# Patient Record
Sex: MALE | Race: WHITE | NOT HISPANIC OR LATINO | Employment: FULL TIME | URBAN - METROPOLITAN AREA
[De-identification: names, ages, dates, MRNs, and addresses within clinical notes are randomized per-mention and may not be internally consistent; named-entity substitution may affect disease eponyms.]

---

## 2018-10-17 ENCOUNTER — ANESTHESIA EVENT (OUTPATIENT)
Dept: EMERGENCY MEDICINE | Facility: CLINIC | Age: 33
End: 2018-10-17
Payer: COMMERCIAL

## 2018-10-17 ENCOUNTER — ANESTHESIA (OUTPATIENT)
Dept: EMERGENCY MEDICINE | Facility: CLINIC | Age: 33
End: 2018-10-17
Payer: COMMERCIAL

## 2018-10-17 ENCOUNTER — HOSPITAL ENCOUNTER (EMERGENCY)
Facility: CLINIC | Age: 33
Discharge: HOME | End: 2018-10-17
Attending: FAMILY MEDICINE | Admitting: FAMILY MEDICINE
Payer: COMMERCIAL

## 2018-10-17 VITALS
TEMPERATURE: 97.2 F | BODY MASS INDEX: 30.36 KG/M2 | HEIGHT: 69 IN | WEIGHT: 205 LBS | SYSTOLIC BLOOD PRESSURE: 132 MMHG | HEART RATE: 152 BPM | RESPIRATION RATE: 15 BRPM | DIASTOLIC BLOOD PRESSURE: 101 MMHG | OXYGEN SATURATION: 95 %

## 2018-10-17 DIAGNOSIS — I48.0 PAROXYSMAL ATRIAL FIBRILLATION (H): ICD-10-CM

## 2018-10-17 LAB
ANION GAP SERPL CALCULATED.3IONS-SCNC: 8 MMOL/L (ref 3–14)
BASOPHILS # BLD AUTO: 0 10E9/L (ref 0–0.2)
BASOPHILS NFR BLD AUTO: 0.3 %
BUN SERPL-MCNC: 7 MG/DL (ref 7–30)
CALCIUM SERPL-MCNC: 8.3 MG/DL (ref 8.5–10.1)
CHLORIDE SERPL-SCNC: 105 MMOL/L (ref 94–109)
CO2 SERPL-SCNC: 26 MMOL/L (ref 20–32)
CREAT SERPL-MCNC: 0.99 MG/DL (ref 0.66–1.25)
DIFFERENTIAL METHOD BLD: NORMAL
EOSINOPHIL # BLD AUTO: 0.1 10E9/L (ref 0–0.7)
EOSINOPHIL NFR BLD AUTO: 0.8 %
ERYTHROCYTE [DISTWIDTH] IN BLOOD BY AUTOMATED COUNT: 11.9 % (ref 10–15)
GFR SERPL CREATININE-BSD FRML MDRD: 87 ML/MIN/1.7M2
GLUCOSE SERPL-MCNC: 86 MG/DL (ref 70–99)
HCT VFR BLD AUTO: 45.6 % (ref 40–53)
HGB BLD-MCNC: 15.9 G/DL (ref 13.3–17.7)
IMM GRANULOCYTES # BLD: 0 10E9/L (ref 0–0.4)
IMM GRANULOCYTES NFR BLD: 0.2 %
LYMPHOCYTES # BLD AUTO: 1.9 10E9/L (ref 0.8–5.3)
LYMPHOCYTES NFR BLD AUTO: 29.6 %
MCH RBC QN AUTO: 31.7 PG (ref 26.5–33)
MCHC RBC AUTO-ENTMCNC: 34.9 G/DL (ref 31.5–36.5)
MCV RBC AUTO: 91 FL (ref 78–100)
MONOCYTES # BLD AUTO: 0.7 10E9/L (ref 0–1.3)
MONOCYTES NFR BLD AUTO: 10.4 %
NEUTROPHILS # BLD AUTO: 3.7 10E9/L (ref 1.6–8.3)
NEUTROPHILS NFR BLD AUTO: 58.7 %
NRBC # BLD AUTO: 0 10*3/UL
NRBC BLD AUTO-RTO: 0 /100
PLATELET # BLD AUTO: 250 10E9/L (ref 150–450)
POTASSIUM SERPL-SCNC: 3.6 MMOL/L (ref 3.4–5.3)
RBC # BLD AUTO: 5.02 10E12/L (ref 4.4–5.9)
SODIUM SERPL-SCNC: 139 MMOL/L (ref 133–144)
TSH SERPL DL<=0.005 MIU/L-ACNC: 1.56 MU/L (ref 0.4–4)
WBC # BLD AUTO: 6.4 10E9/L (ref 4–11)

## 2018-10-17 PROCEDURE — 96360 HYDRATION IV INFUSION INIT: CPT | Mod: 59

## 2018-10-17 PROCEDURE — 92960 CARDIOVERSION ELECTRIC EXT: CPT

## 2018-10-17 PROCEDURE — 84443 ASSAY THYROID STIM HORMONE: CPT | Performed by: FAMILY MEDICINE

## 2018-10-17 PROCEDURE — 40000671 ZZH STATISTIC ANESTHESIA CASE

## 2018-10-17 PROCEDURE — 93005 ELECTROCARDIOGRAM TRACING: CPT | Mod: XU

## 2018-10-17 PROCEDURE — 25000128 H RX IP 250 OP 636: Performed by: FAMILY MEDICINE

## 2018-10-17 PROCEDURE — 92960 CARDIOVERSION ELECTRIC EXT: CPT | Mod: Z6 | Performed by: FAMILY MEDICINE

## 2018-10-17 PROCEDURE — 96361 HYDRATE IV INFUSION ADD-ON: CPT | Mod: 59

## 2018-10-17 PROCEDURE — 99285 EMERGENCY DEPT VISIT HI MDM: CPT | Mod: 25

## 2018-10-17 PROCEDURE — 80048 BASIC METABOLIC PNL TOTAL CA: CPT | Performed by: FAMILY MEDICINE

## 2018-10-17 PROCEDURE — 93010 ELECTROCARDIOGRAM REPORT: CPT | Mod: 59 | Performed by: FAMILY MEDICINE

## 2018-10-17 PROCEDURE — 85025 COMPLETE CBC W/AUTO DIFF WBC: CPT | Performed by: FAMILY MEDICINE

## 2018-10-17 PROCEDURE — 25000128 H RX IP 250 OP 636: Performed by: NURSE ANESTHETIST, CERTIFIED REGISTERED

## 2018-10-17 PROCEDURE — 99285 EMERGENCY DEPT VISIT HI MDM: CPT | Mod: 25 | Performed by: FAMILY MEDICINE

## 2018-10-17 RX ORDER — SODIUM CHLORIDE, SODIUM LACTATE, POTASSIUM CHLORIDE, CALCIUM CHLORIDE 600; 310; 30; 20 MG/100ML; MG/100ML; MG/100ML; MG/100ML
INJECTION, SOLUTION INTRAVENOUS CONTINUOUS PRN
Status: DISCONTINUED | OUTPATIENT
Start: 2018-10-17 | End: 2018-10-17

## 2018-10-17 RX ORDER — PROPOFOL 10 MG/ML
INJECTION, EMULSION INTRAVENOUS PRN
Status: DISCONTINUED | OUTPATIENT
Start: 2018-10-17 | End: 2018-10-17

## 2018-10-17 RX ADMIN — PROPOFOL 100 MG: 10 INJECTION, EMULSION INTRAVENOUS at 13:31

## 2018-10-17 RX ADMIN — SODIUM CHLORIDE, POTASSIUM CHLORIDE, SODIUM LACTATE AND CALCIUM CHLORIDE: 600; 310; 30; 20 INJECTION, SOLUTION INTRAVENOUS at 13:31

## 2018-10-17 RX ADMIN — SODIUM CHLORIDE, POTASSIUM CHLORIDE, SODIUM LACTATE AND CALCIUM CHLORIDE 1000 ML: 600; 310; 30; 20 INJECTION, SOLUTION INTRAVENOUS at 13:29

## 2018-10-17 RX ADMIN — PROPOFOL 50 MG: 10 INJECTION, EMULSION INTRAVENOUS at 13:33

## 2018-10-17 NOTE — ANESTHESIA PREPROCEDURE EVALUATION
Physical Exam  Normal systems: cardiovascular, pulmonary and dental    Airway   Mallampati: II  TM distance: >3 FB  Neck ROM: full    Dental     Cardiovascular       Pulmonary                     Anesthesia Plan      History & Physical Review  History and physical reviewed and following examination; no interval change.    ASA Status:  2 emergent.    NPO Status:  > 6 hours    Plan for MAC Reason for MAC:  Deep or markedly invasive procedure (G8)         Postoperative Care      Consents  Anesthetic plan, risks, benefits and alternatives discussed with:  Patient..                          .

## 2018-10-17 NOTE — ED NOTES
Pt here feeling irregular heart rate, woke up with it this morning. Hx of a-fib, one episode of afib January 2017, was cardioverted at that time. Does not take any medications for HR control or blood thinners. Pt also reports that for the past month he hasn't been able to sleep well (was working overnights) and was consuming energy drinks regularly. Was feeling dizzy as well this morning, denies pain or SOA.

## 2018-10-17 NOTE — ED AVS SNAPSHOT
[facility] Emergency Department    [address] [address]    Phone:  [phone]    Fax:  [phone]                                       Colt Alegria   MRN: [id number]    Department:  [facility] Emergency Department   Date of Visit:  10/17/2018           After Visit Summary Signature Page     I have received my discharge instructions, and my questions have been answered. I have discussed any challenges I see with this plan with the nurse or doctor.    ..........................................................................................................................................  Patient/Patient Representative Signature      ..........................................................................................................................................  Patient Representative Print Name and Relationship to Patient    ..................................................               ................................................  Date                                   Time    ..........................................................................................................................................  Reviewed by Signature/Title    ...................................................              ..............................................  Date                                               Time          22EPIC Rev 08/18

## 2018-10-17 NOTE — ED AVS SNAPSHOT
[facility] Emergency Department    [address] [address]    Phone:  [phone]    Fax:  [phone]                                       Colt Alegria   MRN: [id number]    Department:  [facility] Emergency Department   Date of Visit:  10/17/2018           Patient Information     Date Of Birth          1985        Your diagnoses for this visit were:     Paroxysmal atrial fibrillation (H) Electrically cardioverted to normal sinus rhythm       You were seen by Ishaan Blanco MD.      Follow-up Information     Follow up with Electrophysiologist. Schedule an appointment as soon as possible for a visit in 1 month.        Discharge Instructions         Understanding Atrial Fibrillation    An arrhythmia is any problem with the speed or pattern of the heartbeat. Atrial fibrillation (AFib) is a common type of arrhythmia. It causes fast, chaotic electrical signals in the atria. This leads to poor functioning of the heart. It also affects how much blood your heart can pump out to the body.  Afib may occur once in a while and go away on its own. Or it may continue for longer periods and need treatment.  AFib can lead to serious problems, such as stroke. Your healthcare provider will need to monitor and manage it.  What happens during atrial fibrillation?   The heart has an electrical system that sends signals to control the heartbeat. As signals move through the heart, they tell the heart s upper chambers (atria) and lower chambers (ventricles) when to squeeze (contract) and relax. This lets blood move through the heart and out to the body and lungs.  With AFib, the atria receive abnormal signals. This causes them to contract in a fast and irregular way, and out of sync with the ventricles. When this happens, the atria also have a harder time moving blood into the ventricles. Blood may then pool in the atria, which increases the risk for blood clots and stroke. The ventricles  also may contract too quickly and irregularly. As a result, they may not pump blood to the body and lungs as well as they should. This can weaken the heart muscle over time and cause heart failure.  What causes atrial fibrillation?  AFib is more common in older adults. It has many possible causes including:    Coronary artery disease    Heart valve disease    Heart attack    Heart surgery    High blood pressure    Thyroid disease    Diabetes    Lung disease    Sleep apnea    Heavy alcohol use  In some cases of AFib, doctors do not know the cause.  What are the symptoms of atrial fibrillation?  AFib may or may not cause symptoms. If symptoms do occur, they may include:    A fast, pounding, irregular heartbeat    Shortness of breath    Tiredness    Dizziness or fainting    Chest pain  How is atrial fibrillation treated?  Treatments for AFib can include any of the options below.    Medicines. You may be prescribed:  ? Heart rate medicines to help slow down the heartbeat  ? Heart rhythm medicines to help the heart beat more regularly  ? Anti-clotting medicines to help reduce the risk for blood clots and stroke    Electrical cardioversion. Your healthcare provider uses special pads or paddles to send one or more brief electrical shocks to the heart. This can help reset the heartbeat to normal.    Ablation. Long, thin tubes called catheters are threaded through a blood vessel to the heart. There, the catheters send out hot or cold energy to the areas causing the abnormal signals. This energy destroys the problem tissue or cells. This improves the chances that your heart will stay in normal rhythm without using medicines. If your heart rate and rhythm can t be controlled, you may need ablation and a pacemaker. These will help control the heart rate and regularity of the heartbeat.    Surgery. During surgery, your healthcare provider may use different methods to create scar tissue in the areas of the heart causing the  abnormal signals. The scar tissue disrupts the abnormal signals and may stop AFib from occurring.  What are the complications of atrial fibrillation?  These can include:    Blood clots    Stroke    Heart failure. This problem occurs when the heart muscle weakens so much that it can no longer pump blood well.  When should I call my healthcare provider?  Call your healthcare provider right away if you have any of these:    Symptoms that don t get better with treatment, or get worse    New symptoms   Date Last Reviewed: 5/1/2016 2000-2017 [employer]. [address] [place] [address]. All rights reserved. This information is not intended as a substitute for professional medical care. Always follow your healthcare professional's instructions.      Aspirin 81 mg once daily.  Make sure you keep yourself well-hydrated and avoid alcohol and limit caffeine intake.  I would recommend that you follow-up for outpatient consultation with an electrophysiologist as we discussed.  If you have recurrent episodes please return to the emergency department for anything lasting longer than 6-8 hours.  Any symptoms of chest pain tightness or shortness of air should prompt immediate return to the emergency department.      24 Hour Appointment Hotline       To make an appointment at any [facility], call [phone] ([phone]). If you don't have a family doctor or clinic, we will help you find one. [facility] clinics are conveniently located to serve the needs of you and your family.             Review of your medicines      Notice     You have not been prescribed any medications.            Procedures and tests performed during your visit     Procedure/Test Number of Times Performed    Basic metabolic panel 1    CBC with platelets differential 1    Cardiac Continuous Monitoring 1    EKG 12 lead 2    Peripheral IV catheter 1    TSH with free T4 reflex 1      Orders Needing Specimen Collection     None       Pending Results     No orders found from 10/15/2018 to 10/18/2018.            Pending Culture Results     No orders found from 10/15/2018 to 10/18/2018.            Pending Results Instructions     If you had any lab results that were not finalized at the time of your Discharge, you can call the ED Lab Result RN at [phone]. You will be contacted by this team for any positive Lab results or changes in treatment. The nurses are available 7 days a week from 10A to 6:30P.  You can leave a message 24 hours per day and they will return your call.        Test Results From Your Hospital Stay        10/17/2018  1:13 PM      Component Results     Component Value Ref Range & Units Status    WBC 6.4 4.0 - 11.0 10e9/L Final    RBC Count 5.02 4.4 - 5.9 10e12/L Final    Hemoglobin 15.9 13.3 - 17.7 g/dL Final    Hematocrit 45.6 40.0 - 53.0 % Final    MCV 91 78 - 100 fl Final    MCH 31.7 26.5 - 33.0 pg Final    MCHC 34.9 31.5 - 36.5 g/dL Final    RDW 11.9 10.0 - 15.0 % Final    Platelet Count 250 150 - 450 10e9/L Final    Diff Method Automated Method  Final    % Neutrophils 58.7 % Final    % Lymphocytes 29.6 % Final    % Monocytes 10.4 % Final    % Eosinophils 0.8 % Final    % Basophils 0.3 % Final    % Immature Granulocytes 0.2 % Final    Nucleated RBCs 0 0 /100 Final    Absolute Neutrophil 3.7 1.6 - 8.3 10e9/L Final    Absolute Lymphocytes 1.9 0.8 - 5.3 10e9/L Final    Absolute Monocytes 0.7 0.0 - 1.3 10e9/L Final    Absolute Eosinophils 0.1 0.0 - 0.7 10e9/L Final    Absolute Basophils 0.0 0.0 - 0.2 10e9/L Final    Abs Immature Granulocytes 0.0 0 - 0.4 10e9/L Final    Absolute Nucleated RBC 0.0  Final         10/17/2018  1:32 PM      Component Results     Component Value Ref Range & Units Status    Sodium 139 133 - 144 mmol/L Final    Potassium 3.6 3.4 - 5.3 mmol/L Final    Chloride 105 94 - 109 mmol/L Final    Carbon Dioxide 26 20 - 32 mmol/L Final    Anion Gap 8 3 - 14 mmol/L Final    Glucose 86 70 - 99 mg/dL Final     "Urea Nitrogen 7 7 - 30 mg/dL Final    Creatinine 0.99 0.66 - 1.25 mg/dL Final    GFR Estimate 87 >60 mL/min/1.7m2 Final    Non  GFR Calc    GFR Estimate If Black >90 >60 mL/min/1.7m2 Final    African American GFR Calc    Calcium 8.3 (L) 8.5 - 10.1 mg/dL Final         10/17/2018  1:44 PM      Component Results     Component Value Ref Range & Units Status    TSH 1.56 0.40 - 4.00 mU/L Final                Thank you for choosing [facility]       Thank you for choosing [facility] for your care. Our goal is always to provide you with excellent care. Hearing back from our patients is one way we can continue to improve our services. Please take a few minutes to complete the written survey that you may receive in the mail after you visit with us. Thank you!        [employer] Information     [employer] lets you send messages to your doctor, view your test results, renew your prescriptions, schedule appointments and more. To sign up, go to [email or web address] . Click on \"Log in\" on the left side of the screen, which will take you to the Welcome page. Then click on \"Sign up Now\" on the right side of the page.     You will be asked to enter the access code listed below, as well as some personal information. Please follow the directions to create your username and password.     Your access code is: [email or web address]  Expires: 1/15/2019  3:02 PM     Your access code will  in 90 days. If you need help or a new code, please call your [facility] clinic or [phone].        Care EveryWhere ID     This is your Care EveryWhere ID. This could be used by other organizations to access your [facility] medical records  [id number]        Equal Access to Services     RAJESH JMAES : Raphael Phoenix, yovani pro, izzy christian. So [facility] [phone].    ATENCIÓN: Si habla español, tiene a jacobsen disposición servicios gratuitos de asistencia lingüística. Llame al " [phone].    We comply with applicable federal civil rights laws and [place] laws. We do not discriminate on the basis of race, color, national origin, age, disability, sex, sexual orientation, or gender identity.            After Visit Summary       This is your record. Keep this with you and show to your community pharmacist(s) and doctor(s) at your next visit.

## 2018-10-17 NOTE — DISCHARGE INSTRUCTIONS
Understanding Atrial Fibrillation    An arrhythmia is any problem with the speed or pattern of the heartbeat. Atrial fibrillation (AFib) is a common type of arrhythmia. It causes fast, chaotic electrical signals in the atria. This leads to poor functioning of the heart. It also affects how much blood your heart can pump out to the body.  Afib may occur once in a while and go away on its own. Or it may continue for longer periods and need treatment.  AFib can lead to serious problems, such as stroke. Your healthcare provider will need to monitor and manage it.  What happens during atrial fibrillation?   The heart has an electrical system that sends signals to control the heartbeat. As signals move through the heart, they tell the heart s upper chambers (atria) and lower chambers (ventricles) when to squeeze (contract) and relax. This lets blood move through the heart and out to the body and lungs.  With AFib, the atria receive abnormal signals. This causes them to contract in a fast and irregular way, and out of sync with the ventricles. When this happens, the atria also have a harder time moving blood into the ventricles. Blood may then pool in the atria, which increases the risk for blood clots and stroke. The ventricles also may contract too quickly and irregularly. As a result, they may not pump blood to the body and lungs as well as they should. This can weaken the heart muscle over time and cause heart failure.  What causes atrial fibrillation?  AFib is more common in older adults. It has many possible causes including:    Coronary artery disease    Heart valve disease    Heart attack    Heart surgery    High blood pressure    Thyroid disease    Diabetes    Lung disease    Sleep apnea    Heavy alcohol use  In some cases of AFib, doctors do not know the cause.  What are the symptoms of atrial fibrillation?  AFib may or may not cause symptoms. If symptoms do occur, they may include:    A fast, pounding,  irregular heartbeat    Shortness of breath    Tiredness    Dizziness or fainting    Chest pain  How is atrial fibrillation treated?  Treatments for AFib can include any of the options below.    Medicines. You may be prescribed:  ? Heart rate medicines to help slow down the heartbeat  ? Heart rhythm medicines to help the heart beat more regularly  ? Anti-clotting medicines to help reduce the risk for blood clots and stroke    Electrical cardioversion. Your healthcare provider uses special pads or paddles to send one or more brief electrical shocks to the heart. This can help reset the heartbeat to normal.    Ablation. Long, thin tubes called catheters are threaded through a blood vessel to the heart. There, the catheters send out hot or cold energy to the areas causing the abnormal signals. This energy destroys the problem tissue or cells. This improves the chances that your heart will stay in normal rhythm without using medicines. If your heart rate and rhythm can t be controlled, you may need ablation and a pacemaker. These will help control the heart rate and regularity of the heartbeat.    Surgery. During surgery, your healthcare provider may use different methods to create scar tissue in the areas of the heart causing the abnormal signals. The scar tissue disrupts the abnormal signals and may stop AFib from occurring.  What are the complications of atrial fibrillation?  These can include:    Blood clots    Stroke    Heart failure. This problem occurs when the heart muscle weakens so much that it can no longer pump blood well.  When should I call my healthcare provider?  Call your healthcare provider right away if you have any of these:    Symptoms that don t get better with treatment, or get worse    New symptoms   Date Last Reviewed: 5/1/2016 2000-2017 [employer]. [address] [place] [address], [place] [address]. All rights reserved. This information is not intended as a substitute for professional  medical care. Always follow your healthcare professional's instructions.      Aspirin 81 mg once daily.  Make sure you keep yourself well-hydrated and avoid alcohol and limit caffeine intake.  I would recommend that you follow-up for outpatient consultation with an electrophysiologist as we discussed.  If you have recurrent episodes please return to the emergency department for anything lasting longer than 6-8 hours.  Any symptoms of chest pain tightness or shortness of air should prompt immediate return to the emergency department.

## 2018-10-17 NOTE — ED PROVIDER NOTES
History     Chief Complaint   Patient presents with     Irregular Heart Beat     hx a fib     HPI  Colt Alegria is a 33 year old male, past medical history significant for hypertension atrial fibrillation, presents to the emergency department with concerns of atrial fibrillation onset this morning.  History is obtained from the patient and to a lesser extent from his significant other.  He states that he felt fine when he went to bed last night when he woke up this morning he noted that his heart was pounding like it was going to come out of his chest.  He was familiar with a sensation from his first episode and only other episode about 2 years ago.  At that time he was apparently anticoagulated and then ultimately cardioverted.  He was placed on anticoagulant medication but has not taken it.  He cannot really know what it was.  He has been placed on Cardizem for his blood pressure by his account but he does not take it.  He is recently transitioned from working nights to working days, his caffeine intake has been considerable while working nights over the last month.  He is just readjusting to working days.  He typically eats/drinks.  2-3 cups of caffeinated beverage per day.  He does not smoke.  Infrequent alcohol.  He is not aware of his thyroid status.  The patient denies associated symptoms such as lightheadedness nausea, vomiting, chest pain, pressure, or shortness of air.  He really only notices the rapid irregular heartbeat.  The patient is employed as a , he does considerable physical exercise as part of his job and works out on a regular basis.    Problem List:    Patient Active Problem List    Diagnosis Date Noted     CARDIOVASCULAR SCREENING; LDL GOAL LESS THAN 160 03/05/2014     Priority: Medium        Past Medical History:    Past Medical History:   Diagnosis Date     Hypertension        Past Surgical History:    Past Surgical History:   Procedure Laterality Date     HUMERUS SURGERY  " 2008    Left arm implant     ORTHOPEDIC SURGERY         Family History:    No family history on file.    Social History:  Marital Status:  Single [1]  Social History   Substance Use Topics     Smoking status: Never Smoker     Smokeless tobacco: Never Used     Alcohol use Yes      Comment: Socially        Medications:      No current outpatient prescriptions on file.      Review of Systems   All other systems reviewed and are negative.      Physical Exam   BP: (!) 128/101  Pulse: 152  Heart Rate: 70  Temp: 97.2  F (36.2  C)  Resp: 16  Height: 175.3 cm (5' 9\")  Weight: 93 kg (205 lb)  SpO2: 100 %      Physical Exam   Constitutional: He is oriented to person, place, and time. He appears well-developed and well-nourished.   Alert, no acute distress, he appears calm, somewhat irritable that he is here.  Non-ill and nontoxic   HENT:   Head: Normocephalic and atraumatic.   Right Ear: External ear normal.   Left Ear: External ear normal.   Nose: Nose normal.   Mouth/Throat: Oropharynx is clear and moist.   Eyes: Conjunctivae and EOM are normal. Pupils are equal, round, and reactive to light.   Neck: Normal range of motion. Neck supple.   Cardiovascular: Normal rate, regular rhythm, normal heart sounds and intact distal pulses.    Pulmonary/Chest: Effort normal and breath sounds normal.   Abdominal: Soft. Bowel sounds are normal.   Musculoskeletal: Normal range of motion.   Neurological: He is alert and oriented to person, place, and time.   Skin: Skin is warm and dry.   Psychiatric: He has a normal mood and affect. His behavior is normal.   Nursing note and vitals reviewed.      ED Course     ED Course     Procedures                  EKG Interpretation:      Interpreted by Ishaan Blanco  Time reviewed: 1:00 PM atrial fibrillation with RVR at 116 bpm no acute ST-T wave changes.  Comparison is made to cardiogram dated 9/8/16 demonstrating sinus bradycardia otherwise normal.    After reviewing the EKG and patient's " presentation with he and his significant other we discussed options for cardioversion of atrial fibrillation.  We reviewed electrical cardioversion in this context given that he has had a less than 24 hours of symptoms to suggest atrial fibrillation I think it would be very safe for this option.  He does not require anticoagulation.  Other options would include chemical cardioversion in the emergency department and anticoagulation.  We also discussed a plan going forward for discussion with outpatient cardiology/electrophysiology.  The patient felt that the most acceptable option to him would be electrical cardioversion and he was consented for that.    1:47 PM  Procedure note:  IV access was established, the patient was placed on cardiac monitoring, supplemental O2 and O2 sat monitor.  With signed consent from the patient and with conscious sedation supplied by nurse anesthesia the patient was cardioverted at 200 J biphasic synchronized x1 shock without difficulty to sinus rhythm.  He was allowed to wake up.  After the patient was fully alert and with his significant other present I discussed the plan going forward with them.  I think that given the second occurrence of this, that we are aware of, I would recommend that he be at least anticoagulated with aspirin until he is cardiology consultation.  The patient denies that he drinks alcohol frequently however I do notice that that was a key feature to his previous visit upon review of that visit.  I have recommended that he decrease if not eliminate caffeine, no alcohol, aspirin 81 mg once daily.  Should he have an episode that occurs lasting longer than 6-8 hours she is encouraged to return for potential repeat cardioversion; he understands that we cannot electrically cardiovert him without anticoagulation for episodes lasting longer than 48 hours.  I would not place him on rate control medication today given his history of bradycardia at rest, physically very  active and physically demanding job as this would likely limit his maximal heart rate and he does not want to do this.  He had Cardizem in the past and discontinued himself.  A beta-blocker would be likely a poor choice for him.  I think he also requires treatment for his high blood pressure which he needs to follow-up in clinic with his primary care physician discussed.  I recommended, gave him contact information for, electrophysiology cardiology consultation in clinic as soon as possible.  Patient is disposition to home with all questions answered follow-up plan in place, disposition to the care of his wife to go home.          Critical Care time:  31minutes                 No results found for this or any previous visit (from the past 24 hour(s)).    Medications   lactated ringers BOLUS 1,000 mL (0 mLs Intravenous Stopped 10/17/18 1502)       Assessments & Plan (with Medical Decision Making)   Assessment and plan with medical decision making at the time stamp above.  Disposition is to home.    Disclaimer: This note consists of symbols derived from keyboarding, dictation and/or voice recognition software. As a result, there may be errors in the script that have gone undetected. Please consider this when interpreting information found in this chart.      I have reviewed the nursing notes.    I have reviewed the findings, diagnosis, plan and need for follow up with the patient.       There are no discharge medications for this patient.      Final diagnoses:   Paroxysmal atrial fibrillation (H) - Electrically cardioverted to normal sinus rhythm       10/17/2018   [facility] EMERGENCY DEPARTMENT     Ishaan Blanco MD  10/18/18 8983

## 2018-10-17 NOTE — ANESTHESIA CARE TRANSFER NOTE
Patient: Colt Alegria    * No procedures listed *    Diagnosis: * No pre-op diagnosis entered *  Diagnosis Additional Information: No value filed.    Anesthesia Type:   MAC     Note:  Airway :Nasal Cannula  Patient transferred to:Emergency Department  Handoff Report: Identifed the Patient, Identified the Reponsible Provider, Reviewed the pertinent medical history, Discussed the surgical course, Reviewed Intra-OP anesthesia mangement and issues during anesthesia, Set expectations for post-procedure period and Allowed opportunity for questions and acknowledgement of understanding      Vitals: (Last set prior to Anesthesia Care Transfer)    CRNA VITALS  10/17/2018 1306 - 10/17/2018 1349      10/17/2018             NIBP: 146/77    Pulse: (!)  10    SpO2: 100 %    EKG: NSR                Electronically Signed By: Kimani Rasmussen CRNA, APRN JORG[place]O  October 17, 2018  1:49 PM

## 2018-10-17 NOTE — ANESTHESIA POSTPROCEDURE EVALUATION
Patient: Colt Alegria    * No procedures listed *    Diagnosis:* No pre-op diagnosis entered *  Diagnosis Additional Information: No value filed.    Anesthesia Type:  MAC    Note:  Anesthesia Post Evaluation    Patient location during evaluation: Bedside  Patient participation: Able to fully participate in evaluation  Level of consciousness: awake and alert  Pain management: adequate  Airway patency: patent  Cardiovascular status: acceptable  Respiratory status: acceptable  Hydration status: acceptable  PONV: none     Anesthetic complications: None          Last vitals:  Vitals:    10/17/18 1333 10/17/18 1334 10/17/18 1336   BP: (!) 144/134  (!) 123/92   Pulse:      Resp:  9    Temp:      SpO2:  94% 94%         Electronically Signed By: Kimani Rasmussen CRNA, APRN CRNA  October 17, 2018  1:49 PM

## 2019-08-10 ENCOUNTER — HOSPITAL ENCOUNTER (EMERGENCY)
Facility: CLINIC | Age: 34
Discharge: HOME | End: 2019-08-10
Attending: EMERGENCY MEDICINE | Admitting: EMERGENCY MEDICINE
Payer: COMMERCIAL

## 2019-08-10 ENCOUNTER — APPOINTMENT (OUTPATIENT)
Dept: GENERAL RADIOLOGY | Facility: CLINIC | Age: 34
End: 2019-08-10
Attending: EMERGENCY MEDICINE
Payer: COMMERCIAL

## 2019-08-10 VITALS
BODY MASS INDEX: 29.53 KG/M2 | DIASTOLIC BLOOD PRESSURE: 110 MMHG | WEIGHT: 200 LBS | TEMPERATURE: 98.1 F | RESPIRATION RATE: 13 BRPM | SYSTOLIC BLOOD PRESSURE: 141 MMHG | OXYGEN SATURATION: 98 % | HEART RATE: 74 BPM

## 2019-08-10 DIAGNOSIS — I10 HYPERTENSION, UNSPECIFIED TYPE: ICD-10-CM

## 2019-08-10 DIAGNOSIS — R00.2 PALPITATIONS: ICD-10-CM

## 2019-08-10 LAB
ALBUMIN SERPL-MCNC: 4 G/DL (ref 3.4–5)
ALP SERPL-CCNC: 72 U/L (ref 40–150)
ALT SERPL W P-5'-P-CCNC: 58 U/L (ref 0–70)
ANION GAP SERPL CALCULATED.3IONS-SCNC: 5 MMOL/L (ref 3–14)
AST SERPL W P-5'-P-CCNC: 54 U/L (ref 0–45)
BASOPHILS # BLD AUTO: 0 10E9/L (ref 0–0.2)
BASOPHILS NFR BLD AUTO: 0.6 %
BILIRUB SERPL-MCNC: 0.3 MG/DL (ref 0.2–1.3)
BUN SERPL-MCNC: 6 MG/DL (ref 7–30)
CALCIUM SERPL-MCNC: 8 MG/DL (ref 8.5–10.1)
CHLORIDE SERPL-SCNC: 109 MMOL/L (ref 94–109)
CO2 SERPL-SCNC: 27 MMOL/L (ref 20–32)
CREAT SERPL-MCNC: 0.82 MG/DL (ref 0.66–1.25)
DIFFERENTIAL METHOD BLD: ABNORMAL
EOSINOPHIL # BLD AUTO: 0 10E9/L (ref 0–0.7)
EOSINOPHIL NFR BLD AUTO: 1.2 %
ERYTHROCYTE [DISTWIDTH] IN BLOOD BY AUTOMATED COUNT: 12.2 % (ref 10–15)
GFR SERPL CREATININE-BSD FRML MDRD: >90 ML/MIN/{1.73_M2}
GLUCOSE SERPL-MCNC: 80 MG/DL (ref 70–99)
HCT VFR BLD AUTO: 45.5 % (ref 40–53)
HGB BLD-MCNC: 15.3 G/DL (ref 13.3–17.7)
IMM GRANULOCYTES # BLD: 0 10E9/L (ref 0–0.4)
IMM GRANULOCYTES NFR BLD: 0.3 %
LYMPHOCYTES # BLD AUTO: 1 10E9/L (ref 0.8–5.3)
LYMPHOCYTES NFR BLD AUTO: 29.5 %
MCH RBC QN AUTO: 31 PG (ref 26.5–33)
MCHC RBC AUTO-ENTMCNC: 33.6 G/DL (ref 31.5–36.5)
MCV RBC AUTO: 92 FL (ref 78–100)
MONOCYTES # BLD AUTO: 0.3 10E9/L (ref 0–1.3)
MONOCYTES NFR BLD AUTO: 10.1 %
NEUTROPHILS # BLD AUTO: 2 10E9/L (ref 1.6–8.3)
NEUTROPHILS NFR BLD AUTO: 58.3 %
NRBC # BLD AUTO: 0 10*3/UL
NRBC BLD AUTO-RTO: 0 /100
PLATELET # BLD AUTO: 247 10E9/L (ref 150–450)
POTASSIUM SERPL-SCNC: 4.1 MMOL/L (ref 3.4–5.3)
PROT SERPL-MCNC: 7.1 G/DL (ref 6.8–8.8)
RBC # BLD AUTO: 4.93 10E12/L (ref 4.4–5.9)
SODIUM SERPL-SCNC: 141 MMOL/L (ref 133–144)
TROPONIN I SERPL-MCNC: <0.015 UG/L (ref 0–0.04)
TSH SERPL DL<=0.005 MIU/L-ACNC: 1.32 MU/L (ref 0.4–4)
WBC # BLD AUTO: 3.4 10E9/L (ref 4–11)

## 2019-08-10 PROCEDURE — 93010 ELECTROCARDIOGRAM REPORT: CPT | Mod: Z6 | Performed by: EMERGENCY MEDICINE

## 2019-08-10 PROCEDURE — 84484 ASSAY OF TROPONIN QUANT: CPT | Performed by: EMERGENCY MEDICINE

## 2019-08-10 PROCEDURE — 80053 COMPREHEN METABOLIC PANEL: CPT | Performed by: EMERGENCY MEDICINE

## 2019-08-10 PROCEDURE — 99285 EMERGENCY DEPT VISIT HI MDM: CPT | Mod: 25 | Performed by: EMERGENCY MEDICINE

## 2019-08-10 PROCEDURE — 93005 ELECTROCARDIOGRAM TRACING: CPT | Performed by: EMERGENCY MEDICINE

## 2019-08-10 PROCEDURE — 71046 X-RAY EXAM CHEST 2 VIEWS: CPT

## 2019-08-10 PROCEDURE — 36415 COLL VENOUS BLD VENIPUNCTURE: CPT | Performed by: EMERGENCY MEDICINE

## 2019-08-10 PROCEDURE — 84443 ASSAY THYROID STIM HORMONE: CPT | Performed by: EMERGENCY MEDICINE

## 2019-08-10 PROCEDURE — 85025 COMPLETE CBC W/AUTO DIFF WBC: CPT | Performed by: EMERGENCY MEDICINE

## 2019-08-10 RX ORDER — DILTIAZEM HYDROCHLORIDE 120 MG/1
120 CAPSULE, EXTENDED RELEASE ORAL DAILY
Qty: 30 CAPSULE | Refills: 0 | Status: SHIPPED | OUTPATIENT
Start: 2019-08-10 | End: 2019-08-14

## 2019-08-10 NOTE — ED AVS SNAPSHOT
[facility] Emergency Department  [address] [address]  Phone:  [phone]  Fax:  [phone]                                    Colt Alegria   MRN: [id number]    Department:  [facility] Emergency Department   Date of Visit:  8/10/2019           After Visit Summary Signature Page    I have received my discharge instructions, and my questions have been answered. I have discussed any challenges I see with this plan with the nurse or doctor.    ..........................................................................................................................................  Patient/Patient Representative Signature      ..........................................................................................................................................  Patient Representative Print Name and Relationship to Patient    ..................................................               ................................................  Date                                   Time    ..........................................................................................................................................  Reviewed by Signature/Title    ...................................................              ..............................................  Date                                               Time          22EPIC Rev 08/18

## 2019-08-10 NOTE — DISCHARGE INSTRUCTIONS
1) the cause of your sense of feeling palpitations since early this morning is not clear.  Your time and evaluation in the emergency department does not suggest that you are in atrial fibrillation.    2) Your work-up including lab tests and x-ray imaging are within normal range.    4) During your time  and assessment in the emergency department we did not capture an abnormality in the rhythm of your heart.    5) We have discussed your elevated blood pressure readings and your prior prescription for Cardizem-which was initially prescribed in December 2016 when you are seen for your first episode of atrial fibrillation.  You have reported that you have not been taking this and we have agreed to restart this.    6) A clinic appointment was made for you to follow-up in clinic but to help you with establishing primary care, to follow-up and see how you are tolerating Cardizem to help with referral to cardiology clinic if desired if you continue to have episodes of palpitations or recurrence of atrial fibrillation.    7)We  discussed follow-up care for your report of an episode of left shoulder discomfort and the next steps for care including follow-up in clinic for imaging and/or chiropractic care.

## 2019-08-10 NOTE — ED NOTES
Palpitations, dizziness, nausea, and shortness of breath since this morning.  Patient has a history of a-fib.   Patient takes daily ASA.  Jose Raul Grace RN on 8/10/2019 at 11:27 AM

## 2019-08-10 NOTE — ED PROVIDER NOTES
History     Chief Complaint   Patient presents with     Palpitations     palpitations with dizziness,  report htn, hx of afib     HPI  Colt Alegria is a 33 year old male with a history of paroxysmal atrial fibrillation who presents to the ED with palpitations. The patient reports he began experiencing the palpitations this morning, noting it occurs intermittently every 10 minutes. He states the palpitations feel sporadic and they are visible on the anterior chest. He reports feeling nauseas and dizzy. He denies chest pain and leg swelling. The patient works as a  and works days from 9-5. He reports feeling well this week. He consumes caffeine every day and alcohol every other day with his drink of choice being vodka. He underwent cardioversion on 10/17/18 in the ED. The patient does not take any prescription medications for his atrial fibrillation. His medical records indicate that he was prescribed Cardizem in 2016, but the patient does not recall taking the medication. He takes a baby aspirin daily. He does not see a cardiologist but states he is planning to. The patient reports hypertension runs in the family and he felt like his blood pressure was high this week. He does not take any prescription medications for hypertension. He denies smoking.     Social History: The patient lives in [place], [place]. He presents to the ED alone via private car.    Allergies:  No Known Allergies    Problem List:    Patient Active Problem List    Diagnosis Date Noted     Paroxysmal atrial fibrillation (H)      Priority: Medium     CARDIOVASCULAR SCREENING; LDL GOAL LESS THAN 160 03/05/2014     Priority: Medium        Past Medical History:    Past Medical History:   Diagnosis Date     Hypertension      Paroxysmal atrial fibrillation (H) 12/27/2016       Past Surgical History:    Past Surgical History:   Procedure Laterality Date     CARDIOVERSION  12/27/16, 10/17/18     HUMERUS SURGERY  2008    Left arm implant      ORTHOPEDIC SURGERY         Family History:    No family history on file.    Social History:  Marital Status:   [2]  Social History     Tobacco Use     Smoking status: Never Smoker     Smokeless tobacco: Never Used   Substance Use Topics     Alcohol use: Yes     Comment: Socially     Drug use: No        Medications:      diltiazem ER (DILT-XR) 120 MG 24 hr capsule         Review of Systems    Physical Exam   BP: (!) 158/109  Pulse: 77  Heart Rate: 70  Temp: 98.1  F (36.7  C)  Resp: 16  Weight: 90.7 kg (200 lb)  SpO2: 95 %      Physical Exam    ED Course        Procedures               EKG Interpretation:      Interpreted by Eddie Stanton  Time reviewed: 11:35AM  Symptoms at time of EKG: Palpitations, dizziness   Rhythm: normal sinus   Rate: Normal  Axis: Normal  Ectopy: none  Conduction: normal  ST Segments/ T Waves: Non-specific ST-T wave changes  Q Waves: nonspecific  Comparison to prior: Unchanged from 12/27/16    Clinical Impression: no acute changes        Critical Care time:  none               ED Medications: none      ED Vitals:  Vitals:    08/10/19 1120 08/10/19 1124 08/10/19 1126 08/10/19 1130   BP: (!) 158/109 (!) 143/101  (!) 143/101   Pulse: 77 81     Resp: 16  18 13   Temp: 98.1  F (36.7  C)      TempSrc: Oral      SpO2: 95%  96% 95%   Weight: 90.7 kg (200 lb)          ED Labs and Imaging:  Results for orders placed or performed during the hospital encounter of 08/10/19 (from the past 24 hour(s))   CBC with platelets, differential   Result Value Ref Range    WBC 3.4 (L) 4.0 - 11.0 10e9/L    RBC Count 4.93 4.4 - 5.9 10e12/L    Hemoglobin 15.3 13.3 - 17.7 g/dL    Hematocrit 45.5 40.0 - 53.0 %    MCV 92 78 - 100 fl    MCH 31.0 26.5 - 33.0 pg    MCHC 33.6 31.5 - 36.5 g/dL    RDW 12.2 10.0 - 15.0 %    Platelet Count 247 150 - 450 10e9/L    Diff Method Automated Method     % Neutrophils 58.3 %    % Lymphocytes 29.5 %    % Monocytes 10.1 %    % Eosinophils 1.2 %    % Basophils 0.6 %    %  Immature Granulocytes 0.3 %    Nucleated RBCs 0 0 /100    Absolute Neutrophil 2.0 1.6 - 8.3 10e9/L    Absolute Lymphocytes 1.0 0.8 - 5.3 10e9/L    Absolute Monocytes 0.3 0.0 - 1.3 10e9/L    Absolute Eosinophils 0.0 0.0 - 0.7 10e9/L    Absolute Basophils 0.0 0.0 - 0.2 10e9/L    Abs Immature Granulocytes 0.0 0 - 0.4 10e9/L    Absolute Nucleated RBC 0.0    Comprehensive metabolic panel   Result Value Ref Range    Sodium 141 133 - 144 mmol/L    Potassium 4.1 3.4 - 5.3 mmol/L    Chloride 109 94 - 109 mmol/L    Carbon Dioxide 27 20 - 32 mmol/L    Anion Gap 5 3 - 14 mmol/L    Glucose 80 70 - 99 mg/dL    Urea Nitrogen 6 (L) 7 - 30 mg/dL    Creatinine 0.82 0.66 - 1.25 mg/dL    GFR Estimate >90 >60 mL/min/[1.73_m2]    GFR Estimate If Black >90 >60 mL/min/[1.73_m2]    Calcium 8.0 (L) 8.5 - 10.1 mg/dL    Bilirubin Total 0.3 0.2 - 1.3 mg/dL    Albumin 4.0 3.4 - 5.0 g/dL    Protein Total 7.1 6.8 - 8.8 g/dL    Alkaline Phosphatase 72 40 - 150 U/L    ALT 58 0 - 70 U/L    AST 54 (H) 0 - 45 U/L   Troponin I   Result Value Ref Range    Troponin I ES <0.015 0.000 - 0.045 ug/L   TSH with free T4 reflex   Result Value Ref Range    TSH 1.32 0.40 - 4.00 mU/L   Chest XR,  PA & LAT    Narrative    CHEST TWO VIEWS  8/10/2019 12:30 PM     HISTORY: 33-year-old patient with palpitations      Impression    IMPRESSION: Since December 27, 2016, heart size remains normal. No  pleural effusion, pneumothorax, or abnormal area of consolidation.    BETTY BILLINGS MD        12:05 PM Patient assessed.     Assessments & Plan (with Medical Decision Making)   Clinical Impression: 33-year-old male who presented for concern for palpitations.  Because of his reported symptoms is not clear but during his assessment and care he was not in atrial fibrillation or have any tachyarrhythmia.  He was also noted to be hypertensive and there is concern for untreated hypertension.  Patient has a history of paroxysmal atrial fibrillation with recent cardioversion. He was  cardioverted in October 2018 in the emergency department.  He has been prescribed Cardizem but reports he has not taken it since his medical records indicate he was prescribed Cardizem in 2016.  This morning he felt his chest fluttering.  It was visible anteriorly on the chest and on the skin.  Patient is reporting that he has had some dizziness but has no chest pressure or heaviness.  He also reports no follow-up with cardiology since his visit in the emergency department October 2018, she reports he is never seen a cardiologist since he was diagnosed with atrial fibrillation 2016..  Because of palpitations and feeling dizzy he presents for further care and evaluation.  On my exam he was in no acute distress was notably hypertensive with blood pressure of 143/101.  He had no rubs or gallops on exam.  Lungs are clear to auscultation.      ED Course and Plan:   I reviewed patient's medical records including his initial diagnosis of atrial fibrillation when he was about results on December 2016 and his recent evaluation emergency department October 17, 2018 when he was cardioverted with 200 J.  His arrival  EKG in the department showed no atrial fibrillation in normal sinus rhythm, normal axis, no acute ischemia or arrhythmia with nonspecific T wave changes.  When the EKG was compared with EKG from October 17, 2018 ST changes are old. Patient was monitored on telemetry.  We discussed that he may benefit from follow-up in cardiology clinic.  He is yet to establish care with a cardiologist.  With his history of proximal atrial fibrillation he was monitored on telemetry.  He arrived in normal sinus rhythm and remained in normal sinus rhythm exam.    His work-up in the emergency department today revealed no acute abnormality specifically his troponin was within normal limits his TSH was normal electrolytes were also normal and his chest x-ray did not show any acute cardiothoracic process on my independent review.  Please  see the interpreting radiologist report above.    We discussed options for next steps for care with his report of palpitations and history of paroxysmal atrial fibrillation although patient has not been on any medication since his evaluation in the emergency department when he was cardioverted in October 2018.  Patient has yet to establish care with cardiology.  He remained otherwise asymptomatic during his time in the  Department.    A clinic appointment was made with Dr. CHOCO Bates for August 14 to help patient establish primary care to follow-up on recent initiation of dilitazem, and to help with referral to cardiology clinic if desired or if he has recurrence of atrial fibrillation.  Just prior to discharge he inquired about the left shoulder ache which he had and we discussed follow-up care as an outpatient in clinic. We reviewed reasons to return to the emergency department for reevaluation additional care he expressed understanding and comfort with plan of care.              Disclaimer: This note consists of symbols derived from keyboarding, dictation and/or voice recognition software. As a result, there may be errors in the script that have gone undetected. Please consider this when interpreting information found in this chart.    I have reviewed the nursing notes.    I have reviewed the findings, diagnosis, plan and need for follow up with the patient.       New Prescriptions    DILTIAZEM ER (DILT-XR) 120 MG 24 HR CAPSULE    Take 1 capsule (120 mg) by mouth daily       Final diagnoses:   Palpitations - Asymptomatic in the department, in normal sinus rhythm, history of paroxysmal atrial fibrillation   Hypertension, unspecified type - Elevated blood pressure readings, no documented history of hypertension     This document serves as a record of the services and decisions personally performed and made by Eddie Stanton. The HPI was created on his behalf by Sundeep Marcos, a trained medical scribe. The  creation of this document is based on the provider's statements to the medical scribe.  Sundeep Marcos  11:25 AM August 10, 2019    Provider:  The information in this document created by the medical scribe for me, accurately reflects the services I personally performed and the decisions made by me. I have reviewed approved this document for accuracy prior to leaving the patient care area.  Eddie Stanton, 11:25 AM August 10, 2019    8/10/2019   [facility] EMERGENCY DEPARTMENT     Eddie Stanton MD  08/10/19 1609

## 2019-08-14 ENCOUNTER — OFFICE VISIT (OUTPATIENT)
Dept: FAMILY MEDICINE | Facility: CLINIC | Age: 34
End: 2019-08-14
Payer: COMMERCIAL

## 2019-08-14 VITALS
DIASTOLIC BLOOD PRESSURE: 80 MMHG | BODY MASS INDEX: 27.92 KG/M2 | OXYGEN SATURATION: 97 % | TEMPERATURE: 97.9 F | HEART RATE: 76 BPM | WEIGHT: 195 LBS | RESPIRATION RATE: 16 BRPM | SYSTOLIC BLOOD PRESSURE: 120 MMHG | HEIGHT: 70 IN

## 2019-08-14 DIAGNOSIS — I48.0 PAROXYSMAL ATRIAL FIBRILLATION (H): Primary | ICD-10-CM

## 2019-08-14 DIAGNOSIS — I10 ESSENTIAL HYPERTENSION: ICD-10-CM

## 2019-08-14 PROCEDURE — 99213 OFFICE O/P EST LOW 20 MIN: CPT | Performed by: FAMILY MEDICINE

## 2019-08-14 RX ORDER — DILTIAZEM HYDROCHLORIDE 120 MG/1
120 CAPSULE, EXTENDED RELEASE ORAL DAILY
Qty: 90 CAPSULE | Refills: 3 | Status: SHIPPED | OUTPATIENT
Start: 2019-08-14 | End: 2021-05-12

## 2019-08-14 ASSESSMENT — MIFFLIN-ST. JEOR: SCORE: 1826.79

## 2019-08-14 ASSESSMENT — PAIN SCALES - GENERAL: PAINLEVEL: NO PAIN (0)

## 2019-08-14 NOTE — PATIENT INSTRUCTIONS
Please see cardiology since you have had a history of intermittent irregular heart rate.    You blood pressure is controlled.    I refilled your blood pressure medication for the year.          Thank you for choosing [facility].  You may be receiving an email and/or telephone survey request from [facility] Customer Experience regarding your visit today.  Please take a few minutes to respond to the survey to let us know how we are doing.      If you have questions or concerns, please contact us via [facility] or you can contact your care team at [phone].    Our Clinic hours are:  Monday 6:40 am  to 7:00 pm  Tuesday -Friday 6:40 am to 5:00 pm    The [place] outpatient lab hours are:  Monday - Friday 6:10 am to 4:45 pm  Saturdays 7:00 am to 11:00 am  Appointments are required, call [phone]    If you have clinical questions after hours or would like to schedule an appointment,  call the clinic at [phone].

## 2019-08-14 NOTE — PROGRESS NOTES
"Subjective     Colt Alegria is a 33 year old male who presents to clinic today for the following health issues:    HPI       Hospital Follow-up Visit:    Hospital/Nursing Home/IP Rehab Facility: [facility]  Date of Admission: 08/10/19  Date of Discharge: 08/10/19  Reason(s) for Admission: palpitations            Problems taking medications regularly:  None       Medication changes since discharge: None       Problems adhering to non-medication therapy:  None    Summary of hospitalization:  [facility] discharge summary reviewed  Diagnostic Tests/Treatments reviewed.  Follow up needed: needs to see cardiology  Other Healthcare Providers Involved in Patient s Care:         None  Update since discharge: improved.     Post Discharge Medication Reconciliation: discharge medications reconciled and changed, per note/orders (see AVS).  Plan of care communicated with patient     Coding guidelines for this visit:  Type of Medical   Decision Making Face-to-Face Visit       within 7 Days of discharge Face-to-Face Visit        within 14 days of discharge   Moderate Complexity [id number]   High Complexity 42426 79600                      Reviewed and updated as needed this visit by Provider         Review of Systems   ROS COMP: CONSTITUTIONAL:NEGATIVE for fever, chills, change in weight  INTEGUMENTARY/SKIN: NEGATIVE for worrisome rashes, moles or lesions  RESP:NEGATIVE for significant cough or SOB  CV: NEGATIVE for chest pain, palpitations or peripheral edema  MUSCULOSKELETAL: for his shoulder issue he is seeing the chiropractor  PSYCHIATRIC: NEGATIVE for changes in mood or affect      Objective    /80 (BP Location: Right arm, Patient Position: Sitting, Cuff Size: Adult Large)   Pulse 76   Temp 97.9  F (36.6  C) (Tympanic)   Resp 16   Ht 1.772 m (5' 9.75\")   Wt 88.5 kg (195 lb)   SpO2 97%   BMI 28.18 kg/m    Body mass index is 28.18 kg/m .  Physical Exam   GENERAL APPEARANCE: healthy, alert " "and no distress  RESP: lungs clear to auscultation - no rales, rhonchi or wheezes  CV: regular rates and rhythm, normal S1 S2, no S3 or S4 and no murmur, click or rub  MS: extremities normal- no gross deformities noted  SKIN: no suspicious lesions or rashes  NEURO: Normal strength and tone, mentation intact and speech normal  PSYCH: mentation appears normal and affect normal/bright            Assessment & Plan     (I48.0) Paroxysmal atrial fibrillation (H)  (primary encounter diagnosis)  Comment: referral to cardiology is done since this was recommended in the past and has recent feeling like he was in atrial fibrillation, wondering about further eval and treatment, bp is controlled now while on medication which was refilled.  Plan: CARDIOLOGY EVAL ADULT REFERRAL, diltiazem ER         (DILT-XR) 120 MG 24 hr capsule            HTN: medication was refilled for the year and to help with his intermittent atrial fibrillation.       BMI:   Estimated body mass index is 28.18 kg/m  as calculated from the following:    Height as of this encounter: 1.772 m (5' 9.75\").    Weight as of this encounter: 88.5 kg (195 lb).           See Patient Instructions    Return in about 1 year (around 8/14/2020).    Dion Bates MD  [facility]        "

## 2019-10-08 ENCOUNTER — OFFICE VISIT (OUTPATIENT)
Dept: CARDIOLOGY | Facility: CLINIC | Age: 34
End: 2019-10-08
Attending: FAMILY MEDICINE
Payer: COMMERCIAL

## 2019-10-08 VITALS
DIASTOLIC BLOOD PRESSURE: 94 MMHG | OXYGEN SATURATION: 96 % | BODY MASS INDEX: 28.47 KG/M2 | SYSTOLIC BLOOD PRESSURE: 132 MMHG | WEIGHT: 197 LBS | HEART RATE: 85 BPM

## 2019-10-08 DIAGNOSIS — I48.0 PAROXYSMAL ATRIAL FIBRILLATION (H): Primary | ICD-10-CM

## 2019-10-08 PROCEDURE — 99203 OFFICE O/P NEW LOW 30 MIN: CPT | Performed by: INTERNAL MEDICINE

## 2019-10-08 RX ORDER — FLECAINIDE ACETATE 100 MG/1
100 TABLET ORAL 2 TIMES DAILY
Qty: 6 TABLET | Refills: 0 | Status: SHIPPED | OUTPATIENT
Start: 2019-10-08 | End: 2021-05-12

## 2019-10-08 RX ORDER — FLECAINIDE ACETATE 150 MG/1
450 TABLET ORAL PRN
Qty: 15 TABLET | Refills: 3 | Status: SHIPPED | OUTPATIENT
Start: 2019-10-08 | End: 2021-05-12

## 2019-10-08 NOTE — LETTER
10/8/2019    [facility]  [address] [address]    RE: Colt Alergia       Dear Colleague,    I had the pleasure of seeing Colt Alegria in the [facility].    HPI and Plan:   See dictation  [id number]  Orders Placed This Encounter   Procedures     Follow-Up with Cardiac Advanced Practice Provider     Exercise Stress Test (Stress ECG)       Orders Placed This Encounter   Medications     aspirin (ASA) 81 MG tablet     Sig: Take 81 mg by mouth daily     flecainide (TAMBOCOR) 100 MG tablet     Sig: Take 1 tablet (100 mg) by mouth 2 times daily     Dispense:  6 tablet     Refill:  0     flecainide (TAMBOCOR) 150 MG tablet     Sig: Take 3 tablets (450 mg) by mouth as needed (when in AFib longer than 30 minutes. do not exceed more than 3 tablets over 24 hours period)     Dispense:  15 tablet     Refill:  3       There are no discontinued medications.      Encounter Diagnosis   Name Primary?     Paroxysmal atrial fibrillation (H) Yes       CURRENT MEDICATIONS:  Current Outpatient Medications   Medication Sig Dispense Refill     aspirin (ASA) 81 MG tablet Take 81 mg by mouth daily       diltiazem ER (DILT-XR) 120 MG 24 hr capsule Take 1 capsule (120 mg) by mouth daily 90 capsule 3     flecainide (TAMBOCOR) 100 MG tablet Take 1 tablet (100 mg) by mouth 2 times daily 6 tablet 0     flecainide (TAMBOCOR) 150 MG tablet Take 3 tablets (450 mg) by mouth as needed (when in AFib longer than 30 minutes. do not exceed more than 3 tablets over 24 hours period) 15 tablet 3       ALLERGIES   No Known Allergies    PAST MEDICAL HISTORY:  Past Medical History:   Diagnosis Date     Hypertension      Paroxysmal atrial fibrillation (H) 12/27/2016       PAST SURGICAL HISTORY:  Past Surgical History:   Procedure Laterality Date     CARDIOVERSION  12/27/16, 10/17/18     HUMERUS SURGERY  2008    Left arm implant     ORTHOPEDIC SURGERY         FAMILY HISTORY:  Family History    Problem Relation Age of Onset     Heart Disease Father      Heart Disease Maternal Grandfather      Hypertension Sister        SOCIAL HISTORY:  Social History     Socioeconomic History     Marital status:      Spouse name: None     Number of children: None     Years of education: None     Highest education level: None   Occupational History     None   Social Needs     Financial resource strain: None     Food insecurity:     Worry: None     Inability: None     Transportation needs:     Medical: None     Non-medical: None   Tobacco Use     Smoking status: Never Smoker     Smokeless tobacco: Never Used   Substance and Sexual Activity     Alcohol use: Yes     Comment: Socially     Drug use: No     Sexual activity: Yes     Partners: Female   Lifestyle     Physical activity:     Days per week: None     Minutes per session: None     Stress: None   Relationships     Social connections:     Talks on phone: None     Gets together: None     Attends Shinto service: None     Active member of club or organization: None     Attends meetings of clubs or organizations: None     Relationship status: None     Intimate partner violence:     Fear of current or ex partner: None     Emotionally abused: None     Physically abused: None     Forced sexual activity: None   Other Topics Concern     None   Social History Narrative     None       Review of Systems:  Skin:  Negative       Eyes:  Negative      ENT:  Negative      Respiratory:  Negative       Cardiovascular:  Negative      Gastroenterology: Negative      Genitourinary:  Negative      Musculoskeletal:  Negative      Neurologic:  Negative      Psychiatric:  Negative      Heme/Lymph/Imm:  Negative      Endocrine:  Negative        Physical Exam:  Vitals: BP (!) 132/94   Pulse 85   Wt 89.4 kg (197 lb)   SpO2 96%   BMI 28.47 kg/m       Constitutional:  cooperative, alert and oriented, well developed, well nourished, in no acute distress        Skin:  warm and dry to the  touch, no apparent skin lesions or masses noted          Head:  normocephalic, no masses or lesions        Eyes:  pupils equal and round, conjunctivae and lids unremarkable, sclera white, no xanthalasma, EOMS intact, no nystagmus        Lymph:No Cervical lymphadenopathy present     ENT:  no pallor or cyanosis, dentition good        Neck:  carotid pulses are full and equal bilaterally, JVP normal, no carotid bruit        Respiratory:  normal breath sounds, clear to auscultation, normal A-P diameter, normal symmetry, normal respiratory excursion, no use of accessory muscles         Cardiac: regular rhythm, normal S1/S2, no S3 or S4, apical impulse not displaced, no murmurs, gallops or rubs                                                         GI:  abdomen soft, non-tender, BS normoactive, no mass, no HSM, no bruits        Extremities and Muscular Skeletal:  no deformities, clubbing, cyanosis, erythema observed              Neurological:  no gross motor deficits        Psych:  Alert and Oriented x 3        CC  Dion Bates MD  [address] [place] [id number]                Thank you for allowing me to participate in the care of your patient.      Sincerely,     Dangelo Sanchez MD     [facility]    cc:   Dion Bates MD  [address] [place] [id number]

## 2019-10-08 NOTE — PROGRESS NOTES
"Service Date: 10/08/2019      Dion Bates MD    [facility]    [address] [place] [address]      RE: Colt Alegria   MRN: [id number]   : 1985      Dear Dr. Bates:       Thank you for allowing me to participate in the care of this very delightful patient.  As you know, Colt is a 33-year-old  with a history of paroxysmal atrial fibrillation for the past 3 years now.  He does have a family history of heart disease.  Namely, one of his parents  of \"heart failure\" at age of 58 but was never told to have atrial fibrillation. He had been cardioverted twice in the emergency room with the last one being a year ago and the other time was in 2016.      He does have hypertension for which he has been taking diltiazem and on a baby aspirin only, given a CHADS-VASc score of 1.  The patient is known to have a normal stress test which was done .  He just went to the ER a few months ago for palpitations and just not feeling well in general when he was in atrial fibrillation.  By the time he got to the ER, it went away on its own.  He has never seen a cardiologist in the past.      Otherwise, Colt is doing quite well.  He denies having chest pain or chest discomfort or shortness of breath with activities.  His job is quite stressful, but he never had any atrial fibrillation while at work.  The patient does drink socially but alcohol does not appear to be a trigger for his atrial fibrillation according to him.  Baseline EKG demonstrates sinus rhythm with a QRS measured at 90 milliseconds.      I discussed at length about potential cause of atrial fibrillation.  In this case, it could be related to his high blood pressure even though it is well controlled.  The patient does not have symptoms suggestive of sleep apnea or endocrine abnormalities.  He did have a thyroid function check in the past and it was in normal range.      Next, we discussed potential " complications from having atrial fibrillation including CVA which is quite low for him, given a CHADS-VASc score of 1, and tachycardia-induced cardiomyopathy which is quite rare given that he does have symptoms with atrial fibrillation.      Lastly, we discussed treatment options including pill-in-the-pocket approach with flecainide whenever he is in atrial fibrillation versus catheter-based ablation.  We both agreed to proceed with the first option at this point in time.  I went over the side effects of flecainide including blurry vision and balance issues and also proarrhythmic potential which is less than 1% per year given he has normal cardiac structure and function.  I would like him to take flecainide 100 mg b.i.d. for 3 days only and hold diltiazem for a treadmill EKG.  If it looks unremarkable, I will give him a prescription of flecainide to take at 450 mg which is 3 tablets of 150 mg whenever he is in atrial fibrillation longer than 30 minutes.  I would like him to call us on that day so that we can prepare him for direct current cardioversion the next day if the flecainide fails to convert his atrial fibrillation.      Should the patient have more atrial fibrillation in the future, I would lean toward the ablation rather than taking flecainide on a daily basis given that the ablation can have a higher success rate than medication.  If the patient is doing quite well, I would like the patient to come back to see one of our advanced practice providers a year from now and see me every other year.        Sincerely,         KHADRA RED MD             D: 10/08/2019   T: 10/08/2019   MT: MULU      Name:     CLIFF CACERES   MRN:      [id number]        Account:      [id number]   :      1985           Service Date: 10/08/2019      Document: [id number]

## 2019-10-08 NOTE — PROGRESS NOTES
HPI and Plan:   See dictation  [id number]  Orders Placed This Encounter   Procedures     Follow-Up with Cardiac Advanced Practice Provider     Exercise Stress Test (Stress ECG)       Orders Placed This Encounter   Medications     aspirin (ASA) 81 MG tablet     Sig: Take 81 mg by mouth daily     flecainide (TAMBOCOR) 100 MG tablet     Sig: Take 1 tablet (100 mg) by mouth 2 times daily     Dispense:  6 tablet     Refill:  0     flecainide (TAMBOCOR) 150 MG tablet     Sig: Take 3 tablets (450 mg) by mouth as needed (when in AFib longer than 30 minutes. do not exceed more than 3 tablets over 24 hours period)     Dispense:  15 tablet     Refill:  3       There are no discontinued medications.      Encounter Diagnosis   Name Primary?     Paroxysmal atrial fibrillation (H) Yes       CURRENT MEDICATIONS:  Current Outpatient Medications   Medication Sig Dispense Refill     aspirin (ASA) 81 MG tablet Take 81 mg by mouth daily       diltiazem ER (DILT-XR) 120 MG 24 hr capsule Take 1 capsule (120 mg) by mouth daily 90 capsule 3     flecainide (TAMBOCOR) 100 MG tablet Take 1 tablet (100 mg) by mouth 2 times daily 6 tablet 0     flecainide (TAMBOCOR) 150 MG tablet Take 3 tablets (450 mg) by mouth as needed (when in AFib longer than 30 minutes. do not exceed more than 3 tablets over 24 hours period) 15 tablet 3       ALLERGIES   No Known Allergies    PAST MEDICAL HISTORY:  Past Medical History:   Diagnosis Date     Hypertension      Paroxysmal atrial fibrillation (H) 12/27/2016       PAST SURGICAL HISTORY:  Past Surgical History:   Procedure Laterality Date     CARDIOVERSION  12/27/16, 10/17/18     HUMERUS SURGERY  2008    Left arm implant     ORTHOPEDIC SURGERY         FAMILY HISTORY:  Family History   Problem Relation Age of Onset     Heart Disease Father      Heart Disease Maternal Grandfather      Hypertension Sister        SOCIAL HISTORY:  Social History     Socioeconomic History     Marital status:      Spouse name: None      Number of children: None     Years of education: None     Highest education level: None   Occupational History     None   Social Needs     Financial resource strain: None     Food insecurity:     Worry: None     Inability: None     Transportation needs:     Medical: None     Non-medical: None   Tobacco Use     Smoking status: Never Smoker     Smokeless tobacco: Never Used   Substance and Sexual Activity     Alcohol use: Yes     Comment: Socially     Drug use: No     Sexual activity: Yes     Partners: Female   Lifestyle     Physical activity:     Days per week: None     Minutes per session: None     Stress: None   Relationships     Social connections:     Talks on phone: None     Gets together: None     Attends Rastafari service: None     Active member of club or organization: None     Attends meetings of clubs or organizations: None     Relationship status: None     Intimate partner violence:     Fear of current or ex partner: None     Emotionally abused: None     Physically abused: None     Forced sexual activity: None   Other Topics Concern     None   Social History Narrative     None       Review of Systems:  Skin:  Negative       Eyes:  Negative      ENT:  Negative      Respiratory:  Negative       Cardiovascular:  Negative      Gastroenterology: Negative      Genitourinary:  Negative      Musculoskeletal:  Negative      Neurologic:  Negative      Psychiatric:  Negative      Heme/Lymph/Imm:  Negative      Endocrine:  Negative        Physical Exam:  Vitals: BP (!) 132/94   Pulse 85   Wt 89.4 kg (197 lb)   SpO2 96%   BMI 28.47 kg/m      Constitutional:  cooperative, alert and oriented, well developed, well nourished, in no acute distress        Skin:  warm and dry to the touch, no apparent skin lesions or masses noted          Head:  normocephalic, no masses or lesions        Eyes:  pupils equal and round, conjunctivae and lids unremarkable, sclera white, no xanthalasma, EOMS intact, no nystagmus         Lymph:No Cervical lymphadenopathy present     ENT:  no pallor or cyanosis, dentition good        Neck:  carotid pulses are full and equal bilaterally, JVP normal, no carotid bruit        Respiratory:  normal breath sounds, clear to auscultation, normal A-P diameter, normal symmetry, normal respiratory excursion, no use of accessory muscles         Cardiac: regular rhythm, normal S1/S2, no S3 or S4, apical impulse not displaced, no murmurs, gallops or rubs                                                         GI:  abdomen soft, non-tender, BS normoactive, no mass, no HSM, no bruits        Extremities and Muscular Skeletal:  no deformities, clubbing, cyanosis, erythema observed              Neurological:  no gross motor deficits        Psych:  Alert and Oriented x 3        CC  Dion Bates MD  [address] [place] [address]

## 2019-10-08 NOTE — PATIENT INSTRUCTIONS
"[facility]~[address]~[place], [place]~[address]  Thank you for your  Heart Care visit today. If you have questions regarding your visit, please contact your cardiology RN, Estefani Reid, at [phone].    Please arrive 15 minutes early to all cardiology appointments.    To schedule a future appointment, we kindly ask that you call cardiology scheduling at [phone] three months prior to requested revisit date.  [facility] cardiology clinic is staffed with \"Advance Practice Providers\". These are our cardiology Physician Assistants and Nurse Practitioners.   Please call cardiology scheduling if you feel you need clinical evaluation with them at any time for any cardiac reason.     To Whom It May Concern:  It was my pleasure in seeing , Colt Alegria at the  [facility] today. Colt has symptomatic paroxysmal atrial fibrillation in the background of normal cardiac structure and function and should have a good prognosis. He can return to work without limitations. Please contact our office for any questions.    Sincerely,         Dangelo Deras MD  Reminder:  For your safety, we ask that you bring in your current medication(s) or an updated list of your medications with you to EACH office visit. Include the medication name, dose of pill on bottle and how you are taking it. Include over-the-counter medications or supplements. Your provider will review this at each visit and plan your care based on your current information.   ~~~~~~~~~~~~~~~~~~~~~~~~~~~~~~~~~~~~~~~  \"[facility]\" campus telephone numbers for reference:  Cardiology Scheduling~[phone]  Diagnostic Imaging Scheduling~[phone]  Lab Scheduling~[phone]  Anticoagulation Clinic~[phone]  Cardiac Rehabilitation~[phone]  CORE Clinic RN's~[phone] (at [facility])  Congential Team [phone] (at [facility])  Cardiology Clinic RN's~[phone] " (Estefani Trick, RN)  ~~~~~~~~~~~~~~~~~~~~~~~~~~~~~~~~~~~~~~~~

## 2019-10-08 NOTE — LETTER
"10/8/2019      Dion Bates MD  [address] [place] [address]      RE: Colt Alegria       Dear Colleague,    I had the pleasure of seeing Colt Alegria in the [facility].    Service Date: 10/08/2019      Dion Bates MD    [facility]    [address] [place] [address]      RE: Colt Alegria   MRN: [id number]   : 1985      Dear Dr. Bates:       Thank you for allowing me to participate in the care of this very delightful patient.  As you know, Colt is a 33-year-old  with a history of paroxysmal atrial fibrillation for the past 3 years now.  He does have a family history of heart disease.  Namely, one of his parents  of \"heart failure\" at age of 58 but was never told to have atrial fibrillation. He had been cardioverted twice in the emergency room with the last one being a year ago and the other time was in 2016.      He does have hypertension for which he has been taking diltiazem and on a baby aspirin only, given a CHADS-VASc score of 1.  The patient is known to have a normal stress test which was done .  He just went to the ER a few months ago for palpitations and just not feeling well in general when he was in atrial fibrillation.  By the time he got to the ER, it went away on its own.  He has never seen a cardiologist in the past.      Otherwise, Colt is doing quite well.  He denies having chest pain or chest discomfort or shortness of breath with activities.  His job is quite stressful, but he never had any atrial fibrillation while at work.  The patient does drink socially but alcohol does not appear to be a trigger for his atrial fibrillation according to him.  Baseline EKG demonstrates sinus rhythm with a QRS measured at 90 milliseconds.      I discussed at length about potential cause of atrial fibrillation.  In this case, it could be related to his high blood pressure even though " it is well controlled.  The patient does not have symptoms suggestive of sleep apnea or endocrine abnormalities.  He did have a thyroid function check in the past and it was in normal range.      Next, we discussed potential complications from having atrial fibrillation including CVA which is quite low for him, given a CHADS-VASc score of 1, and tachycardia-induced cardiomyopathy which is quite rare given that he does have symptoms with atrial fibrillation.      Lastly, we discussed treatment options including pill-in-the-pocket approach with flecainide whenever he is in atrial fibrillation versus catheter-based ablation.  We both agreed to proceed with the first option at this point in time.  I went over the side effects of flecainide including blurry vision and balance issues and also proarrhythmic potential which is less than 1% per year given he has normal cardiac structure and function.  I would like him to take flecainide 100 mg b.i.d. for 3 days only and hold diltiazem for a treadmill EKG.  If it looks unremarkable, I will give him a prescription of flecainide to take at 450 mg which is 3 tablets of 150 mg whenever he is in atrial fibrillation longer than 30 minutes.  I would like him to call us on that day so that we can prepare him for direct current cardioversion the next day if the flecainide fails to convert his atrial fibrillation.      Should the patient have more atrial fibrillation in the future, I would lean toward the ablation rather than taking flecainide on a daily basis given that the ablation can have a higher success rate than medication.  If the patient is doing quite well, I would like the patient to come back to see one of our advanced practice providers a year from now and see me every other year.        Sincerely,         KHADRA RED MD             D: 10/08/2019   T: 10/08/2019   MT: MULU      Name:     CLIFF CACERES   MRN:      [id number]        Account:      [id number]   :       1985           Service Date: 10/08/2019      Document: [id number]           Outpatient Encounter Medications as of 10/8/2019   Medication Sig Dispense Refill     aspirin (ASA) 81 MG tablet Take 81 mg by mouth daily       diltiazem ER (DILT-XR) 120 MG 24 hr capsule Take 1 capsule (120 mg) by mouth daily 90 capsule 3     flecainide (TAMBOCOR) 100 MG tablet Take 1 tablet (100 mg) by mouth 2 times daily 6 tablet 0     flecainide (TAMBOCOR) 150 MG tablet Take 3 tablets (450 mg) by mouth as needed (when in AFib longer than 30 minutes. do not exceed more than 3 tablets over 24 hours period) 15 tablet 3     No facility-administered encounter medications on file as of 10/8/2019.        Again, thank you for allowing me to participate in the care of your patient.      Sincerely,    Dangelo Sanchez MD     [facility]

## 2019-10-09 ENCOUNTER — TELEPHONE (OUTPATIENT)
Dept: CARDIOLOGY | Facility: CLINIC | Age: 34
End: 2019-10-09

## 2019-10-09 NOTE — TELEPHONE ENCOUNTER
Call to [facility] pharmacy to make sure that they understood the prescriptions. Pharmacy stated that yes that they had received both and were understood.  Stated that pt also knew that one prescription was to be taken as tech stated as a loading dose. Which really is to see if pt tolerates and then an EKG will be done on Friday and the other prescription was the pill in the pocket dose.  JNelsonRN

## 2019-10-11 ENCOUNTER — HOSPITAL ENCOUNTER (OUTPATIENT)
Dept: CARDIOLOGY | Facility: CLINIC | Age: 34
Discharge: HOME | End: 2019-10-11
Attending: INTERNAL MEDICINE | Admitting: INTERNAL MEDICINE
Payer: COMMERCIAL

## 2019-10-11 DIAGNOSIS — I48.0 PAROXYSMAL ATRIAL FIBRILLATION (H): ICD-10-CM

## 2019-10-11 PROCEDURE — 93016 CV STRESS TEST SUPVJ ONLY: CPT

## 2019-10-11 PROCEDURE — 93018 CV STRESS TEST I&R ONLY: CPT | Performed by: INTERNAL MEDICINE

## 2019-10-11 PROCEDURE — 93017 CV STRESS TEST TRACING ONLY: CPT

## 2019-10-22 NOTE — RESULT ENCOUNTER NOTE
Disc with patient--he DID NOT take flecainide prior to treadmill stress test. Will discuss with Dr Deras for any further recommendations.

## 2019-10-23 NOTE — RESULT ENCOUNTER NOTE
Disc with patient--he misunderstood my question--he took flecainide 100 mg BID for 3 days prior to the GXT then no flecainide the morning of the GXT. Pt notified he is ok to take the flecainide PRN for AF as discussed at OV with Dr Deras on 10/8/19. Will notify Dr Deras.

## 2020-06-18 ENCOUNTER — VIRTUAL VISIT (OUTPATIENT)
Dept: FAMILY MEDICINE | Facility: CLINIC | Age: 35
End: 2020-06-18
Payer: COMMERCIAL

## 2020-06-18 DIAGNOSIS — B35.1 ONYCHOMYCOSIS OF TOENAIL: ICD-10-CM

## 2020-06-18 DIAGNOSIS — B35.3 TINEA PEDIS OF LEFT FOOT: Primary | ICD-10-CM

## 2020-06-18 PROCEDURE — 99203 OFFICE O/P NEW LOW 30 MIN: CPT | Mod: GT | Performed by: NURSE PRACTITIONER

## 2020-06-18 RX ORDER — FLUCONAZOLE 150 MG/1
TABLET ORAL
Qty: 1 TABLET | Refills: 0 | Status: SHIPPED | OUTPATIENT
Start: 2020-06-18 | End: 2020-06-19

## 2020-06-18 ASSESSMENT — ENCOUNTER SYMPTOMS
ARTHRALGIAS: 0
CHILLS: 0
FEVER: 0
MYALGIAS: 0

## 2020-06-18 NOTE — PROGRESS NOTES
"Colt Alegria is a 34 year old male who is being evaluated via a billable video visit.      The patient has been notified of following:     \"This video visit will be conducted via a call between you and your physician/provider. We have found that certain health care needs can be provided without the need for an in-person physical exam.  This service lets us provide the care you need with a video conversation.  If a prescription is necessary we can send it directly to your pharmacy.  If lab work is needed we can place an order for that and you can then stop by our lab to have the test done at a later time.    Video visits are billed at different rates depending on your insurance coverage.  Please reach out to your insurance provider with any questions.    If during the course of the call the physician/provider feels a video visit is not appropriate, you will not be charged for this service.\"    Patient has given verbal consent for Video visit? Yes - [phone]    Will anyone else be joining your video visit? No    Subjective     Colt Alegria is a 34 year old male who presents today via video visit for the following health issues:    HPI     *Toe Fungus - Has been dealing with toe fungus for about 4-5 years on his great toe, 4th toe and 5th toe of left foot. Does not itch or cause discomfort but does seem to be spreading now. Wife does have similar issue.    Rash  Onset: 6-7 years    Description:   Location: Left foot  Character: raised, flakey, redness  Itching (Pruritis): YES    Progression of Symptoms:  worsening    Accompanying Signs & Symptoms:  Fever: no   Body aches or joint pain: no   Sore throat symptoms: no   Recent cold symptoms: no     History:   Previous similar rash: no     Precipitating factors:   Exposure to similar rash: no   New exposures: None   Recent travel: no     Alleviating factors:  OTC moisturizing cream    Therapies Tried and outcome: OTC moisturizing cream - does help to alleviate " "some of the itching but does not help to alleviate the rash itself.       Video Start Time: 130    Current Outpatient Medications   Medication Sig Dispense Refill     aspirin (ASA) 81 MG tablet Take 81 mg by mouth daily       diltiazem ER (DILT-XR) 120 MG 24 hr capsule Take 1 capsule (120 mg) by mouth daily 90 capsule 3     flecainide (TAMBOCOR) 100 MG tablet Take 1 tablet (100 mg) by mouth 2 times daily 6 tablet 0     flecainide (TAMBOCOR) 150 MG tablet Take 3 tablets (450 mg) by mouth as needed (when in AFib longer than 30 minutes. do not exceed more than 3 tablets over 24 hours period) 15 tablet 3     fluconazole (DIFLUCAN) 150 MG tablet Take 1 tab weekly for 6 weeks. 1 tablet 0     No Known Allergies    Reviewed and updated as needed this visit by Provider          Video Start Time: 1:30 PM     Attempted [facility]Well-uncessful.      Has been dealing with fungus on toes to toenail. Left great toe. Has now gradually spread to others, mainly the fourth and fifth toes. No itching to nails. Does have some itching to top of left great toe. Does have a rash and flaking skin to top of all toes. Will be red at times. Nails are very thick. Has not tried any other things for this. Feet dont sweat a lot during the day.   Toenail is not loose.  No injury to nails that is aware of.      Review of Systems   Constitutional: Negative for chills and fever.   Musculoskeletal: Negative for arthralgias and myalgias.   Skin: Positive for rash.        Thick, yellow, discolored great, fourth and fifth toenails of left foot.         Objective    There were no vitals taken for this visit.  Estimated body mass index is 28.47 kg/m  as calculated from the following:    Height as of 8/14/19: 1.772 m (5' 9.75\").    Weight as of 10/8/19: 89.4 kg (197 lb).  Physical Exam  Constitutional:       General: He is not in acute distress.     Appearance: Normal appearance. He is not toxic-appearing.   HENT:      Nose: No congestion.   Eyes:      General: " Lids are normal.   Pulmonary:      Effort: Pulmonary effort is normal. No tachypnea, bradypnea or respiratory distress.   Skin:     Coloration: Skin is not ashen, cyanotic, jaundiced or pale.      Comments: Flaking, erythemic skin noted over top of left foot.  Great toe, fourth and fifth toe to left foot with dry, thick, yellowed, brittle nails.   Neurological:      Mental Status: He is alert.   Psychiatric:         Mood and Affect: Mood normal.         Speech: Speech normal.         Behavior: Behavior normal. Behavior is cooperative.              Assessment & Plan     1. Tinea pedis of left foot  Ideally would like BARON slide.  However, due to COVID-19 outbreak will treat empirically for tinea.  Educated on use of Diflucan.  - fluconazole (DIFLUCAN) 150 MG tablet; Take 1 tab weekly for 6 weeks.  Dispense: 1 tablet; Refill: 0    2. Onychomycosis of toenail  If notes improvement in skin after 6 weeks will plan longer treatment to treat toenails  - fluconazole (DIFLUCAN) 150 MG tablet; Take 1 tab weekly for 6 weeks.  Dispense: 1 tablet; Refill: 0     No follow-ups on file.    ZULEYKA Yoon CNP  [facility]      Video-Visit Details    Type of service:  Video Visit    Video End Time:142    Originating Location (pt. Location): Home    Distant Location (provider location):  [facility]     Platform used for Video Visit: Doximity    No follow-ups on file.       ZULEYKA Yoon CNP

## 2021-01-14 ENCOUNTER — HEALTH MAINTENANCE LETTER (OUTPATIENT)
Age: 36
End: 2021-01-14

## 2021-05-12 ENCOUNTER — OFFICE VISIT (OUTPATIENT)
Dept: FAMILY MEDICINE | Facility: CLINIC | Age: 36
End: 2021-05-12
Payer: COMMERCIAL

## 2021-05-12 ENCOUNTER — E-VISIT (OUTPATIENT)
Dept: FAMILY MEDICINE | Facility: CLINIC | Age: 36
End: 2021-05-12

## 2021-05-12 VITALS
HEART RATE: 85 BPM | BODY MASS INDEX: 29.78 KG/M2 | WEIGHT: 208 LBS | HEIGHT: 70 IN | TEMPERATURE: 98.6 F | SYSTOLIC BLOOD PRESSURE: 146 MMHG | DIASTOLIC BLOOD PRESSURE: 94 MMHG | OXYGEN SATURATION: 99 %

## 2021-05-12 DIAGNOSIS — I10 ESSENTIAL HYPERTENSION: Primary | ICD-10-CM

## 2021-05-12 DIAGNOSIS — F41.9 ANXIETY AND DEPRESSION: ICD-10-CM

## 2021-05-12 DIAGNOSIS — Z53.9 ERRONEOUS ENCOUNTER--DISREGARD: Primary | ICD-10-CM

## 2021-05-12 DIAGNOSIS — F32.A ANXIETY AND DEPRESSION: ICD-10-CM

## 2021-05-12 DIAGNOSIS — I48.0 PAROXYSMAL ATRIAL FIBRILLATION (H): ICD-10-CM

## 2021-05-12 PROCEDURE — 99214 OFFICE O/P EST MOD 30 MIN: CPT | Performed by: FAMILY MEDICINE

## 2021-05-12 RX ORDER — FLUOXETINE 10 MG/1
10 CAPSULE ORAL DAILY
Qty: 60 CAPSULE | Refills: 1 | Status: SHIPPED | OUTPATIENT
Start: 2021-05-12 | End: 2021-12-01

## 2021-05-12 RX ORDER — FLECAINIDE ACETATE 150 MG/1
450 TABLET ORAL PRN
Qty: 15 TABLET | Refills: 3 | Status: SHIPPED | OUTPATIENT
Start: 2021-05-12 | End: 2022-06-16

## 2021-05-12 RX ORDER — DILTIAZEM HYDROCHLORIDE 120 MG/1
120 CAPSULE, EXTENDED RELEASE ORAL DAILY
Qty: 90 CAPSULE | Refills: 3 | Status: SHIPPED | OUTPATIENT
Start: 2021-05-12 | End: 2022-06-16

## 2021-05-12 ASSESSMENT — MIFFLIN-ST. JEOR: SCORE: 1875.76

## 2021-05-12 ASSESSMENT — ANXIETY QUESTIONNAIRES
2. NOT BEING ABLE TO STOP OR CONTROL WORRYING: SEVERAL DAYS
1. FEELING NERVOUS, ANXIOUS, OR ON EDGE: SEVERAL DAYS
5. BEING SO RESTLESS THAT IT IS HARD TO SIT STILL: SEVERAL DAYS
5. BEING SO RESTLESS THAT IT IS HARD TO SIT STILL: NEARLY EVERY DAY
3. WORRYING TOO MUCH ABOUT DIFFERENT THINGS: MORE THAN HALF THE DAYS
GAD7 TOTAL SCORE: 14
4. TROUBLE RELAXING: SEVERAL DAYS
1. FEELING NERVOUS, ANXIOUS, OR ON EDGE: SEVERAL DAYS
7. FEELING AFRAID AS IF SOMETHING AWFUL MIGHT HAPPEN: NEARLY EVERY DAY
7. FEELING AFRAID AS IF SOMETHING AWFUL MIGHT HAPPEN: MORE THAN HALF THE DAYS
GAD7 TOTAL SCORE: 14
3. WORRYING TOO MUCH ABOUT DIFFERENT THINGS: MORE THAN HALF THE DAYS
2. NOT BEING ABLE TO STOP OR CONTROL WORRYING: MORE THAN HALF THE DAYS
GAD7 TOTAL SCORE: 14
7. FEELING AFRAID AS IF SOMETHING AWFUL MIGHT HAPPEN: NEARLY EVERY DAY
6. BECOMING EASILY ANNOYED OR IRRITABLE: MORE THAN HALF THE DAYS
GAD7 TOTAL SCORE: 10
6. BECOMING EASILY ANNOYED OR IRRITABLE: MORE THAN HALF THE DAYS

## 2021-05-12 ASSESSMENT — PATIENT HEALTH QUESTIONNAIRE - PHQ9
SUM OF ALL RESPONSES TO PHQ QUESTIONS 1-9: 18
5. POOR APPETITE OR OVEREATING: SEVERAL DAYS
SUM OF ALL RESPONSES TO PHQ QUESTIONS 1-9: 16
SUM OF ALL RESPONSES TO PHQ QUESTIONS 1-9: 18
10. IF YOU CHECKED OFF ANY PROBLEMS, HOW DIFFICULT HAVE THESE PROBLEMS MADE IT FOR YOU TO DO YOUR WORK, TAKE CARE OF THINGS AT HOME, OR GET ALONG WITH OTHER PEOPLE: SOMEWHAT DIFFICULT

## 2021-05-12 NOTE — PATIENT INSTRUCTIONS
Start the medication and restart your diltiazem for the blood pressure  Recheck by phone in 3-4 weeks

## 2021-05-12 NOTE — PROGRESS NOTES
"    Assessment & Plan     Paroxysmal atrial fibrillation (H)  Cont current treatment has not needed the flecanide in a while and needs a new prescription. Reviewed Dr. Deras's note. He is not having frequent episodes and he declines seeong cardiology at this point   - diltiazem ER (DILT-XR) 120 MG 24 hr capsule; Take 1 capsule (120 mg) by mouth daily  - flecainide (TAMBOCOR) 150 MG tablet; Take 3 tablets (450 mg) by mouth as needed (when in AFib longer than 30 minutes. do not exceed more than 3 tablets over 24 hours period)    Essential hypertension      - flecainide (TAMBOCOR) 150 MG tablet; Take 3 tablets (450 mg) by mouth as needed (when in AFib longer than 30 minutes. do not exceed more than 3 tablets over 24 hours period)    Anxiety and depression  Will start cont with talk therapy  - FLUoxetine (PROZAC) 10 MG capsule; Take 1 capsule (10 mg) by mouth daily For 1 week then increase to 2 caps         BMI:   Estimated body mass index is 30.06 kg/m  as calculated from the following:    Height as of this encounter: 1.772 m (5' 9.75\").    Weight as of this encounter: 94.3 kg (208 lb).   Weight management plan: Discussed healthy diet and exercise guidelines    Depression Screening Follow Up    PHQ 5/12/2021   PHQ-9 Total Score 16   Q9: Thoughts of better off dead/self-harm past 2 weeks Not at all     Last PHQ-9 5/12/2021   1.  Little interest or pleasure in doing things 2   2.  Feeling down, depressed, or hopeless 1   3.  Trouble falling or staying asleep, or sleeping too much 2   4.  Feeling tired or having little energy 3   5.  Poor appetite or overeating 0   6.  Feeling bad about yourself 3   7.  Trouble concentrating 3   8.  Moving slowly or restless 2   Q9: Thoughts of better off dead/self-harm past 2 weeks 0   PHQ-9 Total Score 16       Follow Up Actions Taken       See Patient Instructions    Return in about 4 weeks (around 6/9/2021) for follow up phone visit.    Caitlyn Gardner MD  [facility] " AN Bingham is a 35 year old who presents for the following health issues     HPI     Abnormal Mood Symptoms  Onset/Duration: started seeing therapist about a year ago.   Description:   Depression (if yes, do PHQ-9): YES  Anxiety (if yes, do ZAIRA-7): YES  Accompanying Signs & Symptoms:  Still participating in activities that you used to enjoy: NO  Fatigue: YES  Irritability: YES  Difficulty concentrating: YES  Changes in appetite: no  Problems with sleep: YES  Heart racing/beating fast: YES - intermittent  Abnormally elevated, expansive, or irritable mood: YES - sometimes   Persistently increased activity or energy: no  Thoughts of hurting yourself or others: no  History:  Recent stress or major life event: YES - relationship with family, wife wants them to move, ,   Prior depression or anxiety: None  Family history of depression or anxiety: YES  Alcohol/drug use: no  Precipitating or alleviating factors: None  Therapies tried and outcome: group therapy and individual therapy    PHQ 5/12/2021 5/12/2021   PHQ-9 Total Score 18 16   Q9: Thoughts of better off dead/self-harm past 2 weeks Not at all Not at all     ZAIRA-7 SCORE 5/12/2021 5/12/2021   Total Score - 14 (moderate anxiety)   Total Score 14 10       Hypertension Follow-up - has been off medication 6 months ago.     Do you check your blood pressure regularly outside of the clinic? Yes  144/104 today at work     Are you following a low salt diet? No    Are your blood pressures ever more than 140 on the top number (systolic) OR more   than 90 on the bottom number (diastolic), for example 140/90? Yes    BP Readings from Last 6 Encounters:   05/12/21 (!) 146/94   10/08/19 (!) 132/94   08/14/19 120/80   08/10/19 (!) 141/110   10/17/18 (!) 132/101   12/27/16 (!) 142/99       Has been out of the diltiazem for a while   Will refill for now   Has only had about 2 episodes of the a fib in the last 2 years  Would like to be less angry , increased  "motivation  Mom was on antidepressants   Not much insomnia      New Patient/Transfer of Care    Review of Systems   Constitutional, HEENT, cardiovascular, pulmonary, gi and gu systems are negative, except as otherwise noted.      Objective    BP (!) 146/94   Pulse 85   Temp 98.6  F (37  C) (Tympanic)   Ht 1.772 m (5' 9.75\")   Wt 94.3 kg (208 lb)   SpO2 99%   BMI 30.06 kg/m    Body mass index is 30.06 kg/m .  Physical Exam   GENERAL: healthy, alert and no distress  MENTAL STATUS EXAM:    1. Clinical observations: Colt was clean and was adequately groomed. Colt's emotional presentation was cooperative, guarded and passive. He spoke soft. He maintained fair eye contact and he was cooperative in answering questions.   2. He appeared to be well-oriented in all spheres with coherent, logical, goal directed and relevent thinking.   3. Thought content: He denies no abnormal thought process.   4. Affect and mood: Colt's affect is described as normal/appropriate and anxious and his emotional attitude was open, cooperative and passive. He reports the following sypmtoms: drawing away from people, lack of interest or enjoyment, too much worry and nervous or tense feeling.    5. Sensorium and cognition: He was in contact with reality and oriented to time, place and person.  He demonstrated no impairment in immediate, recent, or remote memory. His insight was adequate and his  intelligence appeared to be average.      No results found for any visits on 05/12/21.    Caitlyn Gardner M.D.          "

## 2021-05-13 ASSESSMENT — ANXIETY QUESTIONNAIRES
GAD7 TOTAL SCORE: 14
GAD7 TOTAL SCORE: 10

## 2021-05-13 ASSESSMENT — PATIENT HEALTH QUESTIONNAIRE - PHQ9: SUM OF ALL RESPONSES TO PHQ QUESTIONS 1-9: 18

## 2021-07-15 ENCOUNTER — OFFICE VISIT (OUTPATIENT)
Dept: FAMILY MEDICINE | Facility: CLINIC | Age: 36
End: 2021-07-15
Payer: COMMERCIAL

## 2021-07-15 VITALS
WEIGHT: 206 LBS | BODY MASS INDEX: 29.49 KG/M2 | HEIGHT: 70 IN | DIASTOLIC BLOOD PRESSURE: 80 MMHG | HEART RATE: 76 BPM | SYSTOLIC BLOOD PRESSURE: 112 MMHG | TEMPERATURE: 98.6 F | OXYGEN SATURATION: 99 %

## 2021-07-15 DIAGNOSIS — F41.9 ANXIETY AND DEPRESSION: ICD-10-CM

## 2021-07-15 DIAGNOSIS — F32.A ANXIETY AND DEPRESSION: ICD-10-CM

## 2021-07-15 DIAGNOSIS — B35.1 ONYCHOMYCOSIS DUE TO DERMATOPHYTE: ICD-10-CM

## 2021-07-15 DIAGNOSIS — L60.9 NAIL ABNORMALITY: Primary | ICD-10-CM

## 2021-07-15 PROCEDURE — 99214 OFFICE O/P EST MOD 30 MIN: CPT | Performed by: FAMILY MEDICINE

## 2021-07-15 PROCEDURE — 87101 SKIN FUNGI CULTURE: CPT | Performed by: FAMILY MEDICINE

## 2021-07-15 RX ORDER — FLUOXETINE 10 MG/1
10 CAPSULE ORAL DAILY
Qty: 60 CAPSULE | Refills: 1 | Status: CANCELLED | OUTPATIENT
Start: 2021-07-15

## 2021-07-15 ASSESSMENT — ANXIETY QUESTIONNAIRES
1. FEELING NERVOUS, ANXIOUS, OR ON EDGE: SEVERAL DAYS
GAD7 TOTAL SCORE: 2
7. FEELING AFRAID AS IF SOMETHING AWFUL MIGHT HAPPEN: NOT AT ALL
3. WORRYING TOO MUCH ABOUT DIFFERENT THINGS: NOT AT ALL
6. BECOMING EASILY ANNOYED OR IRRITABLE: SEVERAL DAYS
5. BEING SO RESTLESS THAT IT IS HARD TO SIT STILL: NOT AT ALL
2. NOT BEING ABLE TO STOP OR CONTROL WORRYING: NOT AT ALL

## 2021-07-15 ASSESSMENT — PATIENT HEALTH QUESTIONNAIRE - PHQ9
SUM OF ALL RESPONSES TO PHQ QUESTIONS 1-9: 0
5. POOR APPETITE OR OVEREATING: NOT AT ALL

## 2021-07-15 ASSESSMENT — MIFFLIN-ST. JEOR: SCORE: 1866.69

## 2021-07-15 NOTE — PROGRESS NOTES
Assessment & Plan     Anxiety and depression  Good control   - FLUoxetine (PROZAC) 20 MG capsule; Take 1 capsule (20 mg) by mouth daily    Nail abnormality    - Fungus Culture,  skin, hair, or nail    Onychomycosis due to dermatophyte  Will treat   Results for orders placed or performed in visit on 07/15/21   Fungus Culture,  skin, hair, or nail     Status: Abnormal (Preliminary result)    Specimen: Toe, Left; Nail   Result Value Ref Range    Culture Penicillium species (A)      This species does not respond well to diflucan   - itraconazole (SPORANOX) 100 MG capsule; Take 2 capsules (200 mg) by mouth daily For 12 weeks         See Patient Instructions    Return in about 1 year (around 7/15/2022) for med check, if not improving.    Caitlyn Gardner MD  [facility] AN Bingham is a 35 year old who presents for the following health issues:    HPI     Hypertension Follow-up      Do you check your blood pressure regularly outside of the clinic? Yes  140/88    Are you following a low salt diet? Yes     Are your blood pressures ever more than 140 on the top number (systolic) OR more   than 90 on the bottom number (diastolic), for example 140/90? Yes    Depression and Anxiety Follow-Up    How are you doing with your depression since your last visit? Improved     How are you doing with your anxiety since your last visit?  Improved     Are you having other symptoms that might be associated with depression or anxiety? No    Have you had a significant life event? No     Do you have any concerns with your use of alcohol or other drugs? No    Social History     Tobacco Use     Smoking status: Never Smoker     Smokeless tobacco: Never Used   Substance Use Topics     Alcohol use: Yes     Comment: Socially     Drug use: No     PHQ 5/12/2021 5/12/2021   PHQ-9 Total Score 18 16   Q9: Thoughts of better off dead/self-harm past 2 weeks Not at all Not at all     ZAIRA-7 SCORE 5/12/2021 5/12/2021   Total  "Score - 14 (moderate anxiety)   Total Score 14 10               Also was treated for toenail fungus through virtual visit last year he took the course of medication and it really did not change at all.  It is primarily just the great toe on the left foot      Review of Systems   Constitutional, HEENT, cardiovascular, pulmonary, gi and gu systems are negative, except as otherwise noted.      Objective    /80   Pulse 76   Temp 98.6  F (37  C) (Tympanic)   Ht 1.772 m (5' 9.75\")   Wt 93.4 kg (206 lb)   SpO2 99%   BMI 29.77 kg/m    Body mass index is 29.77 kg/m .  Physical Exam   GENERAL: healthy, alert and no distress  NECK: no adenopathy, no asymmetry, masses, or scars and thyroid normal to palpation  MS: no gross musculoskeletal defects noted, no edema  SKIN: no suspicious lesions or rashes and great toe l medially area of thickening this was clipped and sent for fungal culture   NEURO: Normal strength and tone, mentation intact and speech normal  PSYCH: mentation appears normal, affect normal/bright    Results for orders placed or performed in visit on 07/15/21   Fungus Culture,  skin, hair, or nail     Status: Abnormal (Preliminary result)    Specimen: Toe, Left; Nail   Result Value Ref Range    Culture Penicillium species (A)        Caitlyn Gardner M.D.          "

## 2021-07-16 ASSESSMENT — ANXIETY QUESTIONNAIRES: GAD7 TOTAL SCORE: 2

## 2021-07-21 RX ORDER — ITRACONAZOLE 100 MG/1
200 CAPSULE ORAL DAILY
Qty: 90 CAPSULE | Refills: 0 | Status: SHIPPED | OUTPATIENT
Start: 2021-07-21 | End: 2021-10-05

## 2021-08-12 LAB — BACTERIA SPEC CULT: ABNORMAL

## 2021-08-18 NOTE — RESULT ENCOUNTER NOTE
Zachery, the fungus culture is finally back and it is fungus. Let me know if you would like to start oral treatment and I can send that in for you. Caitlyn Gardner M.D.

## 2021-09-24 ENCOUNTER — TELEPHONE (OUTPATIENT)
Dept: FAMILY MEDICINE | Facility: CLINIC | Age: 36
End: 2021-09-24

## 2021-09-24 NOTE — TELEPHONE ENCOUNTER
07-15-21 OV, toenail fungus.  Sporanox prescribed 07-21-21, pt not aware Rx ordered.  Pt will check with pharmacy to see if still active med. If not, will call back for Rx.  NORRIS Montes RN

## 2021-10-05 ENCOUNTER — OFFICE VISIT (OUTPATIENT)
Dept: FAMILY MEDICINE | Facility: CLINIC | Age: 36
End: 2021-10-05
Payer: COMMERCIAL

## 2021-10-05 VITALS
WEIGHT: 207 LBS | BODY MASS INDEX: 29.63 KG/M2 | HEIGHT: 70 IN | OXYGEN SATURATION: 98 % | SYSTOLIC BLOOD PRESSURE: 124 MMHG | HEART RATE: 65 BPM | DIASTOLIC BLOOD PRESSURE: 72 MMHG | TEMPERATURE: 97.7 F

## 2021-10-05 DIAGNOSIS — B35.1 ONYCHOMYCOSIS DUE TO DERMATOPHYTE: ICD-10-CM

## 2021-10-05 PROCEDURE — 99213 OFFICE O/P EST LOW 20 MIN: CPT | Performed by: PHYSICIAN ASSISTANT

## 2021-10-05 RX ORDER — ITRACONAZOLE 100 MG/1
200 CAPSULE ORAL DAILY
Qty: 90 CAPSULE | Refills: 0 | Status: SHIPPED | OUTPATIENT
Start: 2021-10-05 | End: 2022-06-16

## 2021-10-05 ASSESSMENT — PAIN SCALES - GENERAL: PAINLEVEL: NO PAIN (0)

## 2021-10-05 ASSESSMENT — MIFFLIN-ST. JEOR: SCORE: 1871.23

## 2021-10-05 NOTE — PROGRESS NOTES
"    Assessment & Plan     (B35.1) Onychomycosis due to dermatophyte  Comment: re sent script for itraconazole as patient never picked it up  Plan: itraconazole (SPORANOX) 100 MG capsule          Nothing noted on belly button - no palpable lump today        Return in about 3 months (around 1/5/2022) for If not improving or worsening.    EFRAIN [facility] AN Bingham is a 35 year old who presents for the following health issues     HPI     Concern - Growth on belly button   Onset: 3 months   Description: Lump on the inside of his belly button, sometimes oozing  Intensity: moderate  Progression of Symptoms:  improving  Accompanying Signs & Symptoms: None  Previous history of similar problem: None  Precipitating factors:        Worsened by: None  Alleviating factors:        Improved by: None  Therapies tried and outcome: antifungal    Not as bad today       Still with toenail fungus  Never started the oral medicatino - didn't know it was sent    Review of Systems   Remainder of ROS obtained and found to be negative other than that which was documented above        Objective    /72   Pulse 65   Temp 97.7  F (36.5  C) (Tympanic)   Ht 1.772 m (5' 9.75\")   Wt 93.9 kg (207 lb)   SpO2 98%   BMI 29.91 kg/m    Body mass index is 29.91 kg/m .  Physical Exam   GENERAL: healthy, alert and no distress  ABDOMEN:   No palpable hernia or mass noted  Deep belly button - no noted redness or skin breakdown   No discharge noted          "

## 2021-10-24 ENCOUNTER — HEALTH MAINTENANCE LETTER (OUTPATIENT)
Age: 36
End: 2021-10-24

## 2021-12-01 ENCOUNTER — OFFICE VISIT (OUTPATIENT)
Dept: FAMILY MEDICINE | Facility: CLINIC | Age: 36
End: 2021-12-01
Payer: COMMERCIAL

## 2021-12-01 VITALS
DIASTOLIC BLOOD PRESSURE: 82 MMHG | BODY MASS INDEX: 30.64 KG/M2 | OXYGEN SATURATION: 98 % | HEIGHT: 70 IN | TEMPERATURE: 98.2 F | HEART RATE: 82 BPM | SYSTOLIC BLOOD PRESSURE: 120 MMHG | WEIGHT: 214 LBS

## 2021-12-01 DIAGNOSIS — Z23 HIGH PRIORITY FOR 2019-NCOV VACCINE: ICD-10-CM

## 2021-12-01 DIAGNOSIS — Z23 NEED FOR PROPHYLACTIC VACCINATION AND INOCULATION AGAINST INFLUENZA: ICD-10-CM

## 2021-12-01 DIAGNOSIS — R63.8 UNABLE TO LOSE WEIGHT: ICD-10-CM

## 2021-12-01 DIAGNOSIS — L70.0 CYSTIC ACNE VULGARIS: Primary | ICD-10-CM

## 2021-12-01 LAB
ALBUMIN SERPL-MCNC: 3.7 G/DL (ref 3.4–5)
ALP SERPL-CCNC: 57 U/L (ref 40–150)
ALT SERPL W P-5'-P-CCNC: 41 U/L (ref 0–70)
ANION GAP SERPL CALCULATED.3IONS-SCNC: 5 MMOL/L (ref 3–14)
AST SERPL W P-5'-P-CCNC: 23 U/L (ref 0–45)
BILIRUB SERPL-MCNC: 0.3 MG/DL (ref 0.2–1.3)
BUN SERPL-MCNC: 16 MG/DL (ref 7–30)
CALCIUM SERPL-MCNC: 8.8 MG/DL (ref 8.5–10.1)
CHLORIDE BLD-SCNC: 107 MMOL/L (ref 94–109)
CO2 SERPL-SCNC: 29 MMOL/L (ref 20–32)
CREAT SERPL-MCNC: 1.09 MG/DL (ref 0.66–1.25)
ERYTHROCYTE [DISTWIDTH] IN BLOOD BY AUTOMATED COUNT: 12.5 % (ref 10–15)
GFR SERPL CREATININE-BSD FRML MDRD: 86 ML/MIN/1.73M2
GLUCOSE BLD-MCNC: 75 MG/DL (ref 70–99)
HCT VFR BLD AUTO: 41.2 % (ref 40–53)
HGB BLD-MCNC: 14.3 G/DL (ref 13.3–17.7)
MCH RBC QN AUTO: 31.2 PG (ref 26.5–33)
MCHC RBC AUTO-ENTMCNC: 34.7 G/DL (ref 31.5–36.5)
MCV RBC AUTO: 90 FL (ref 78–100)
PLATELET # BLD AUTO: 219 10E3/UL (ref 150–450)
POTASSIUM BLD-SCNC: 4.1 MMOL/L (ref 3.4–5.3)
PROT SERPL-MCNC: 7 G/DL (ref 6.8–8.8)
RBC # BLD AUTO: 4.59 10E6/UL (ref 4.4–5.9)
SODIUM SERPL-SCNC: 141 MMOL/L (ref 133–144)
TSH SERPL DL<=0.005 MIU/L-ACNC: 1.12 MU/L (ref 0.4–4)
WBC # BLD AUTO: 5.7 10E3/UL (ref 4–11)

## 2021-12-01 PROCEDURE — 99214 OFFICE O/P EST MOD 30 MIN: CPT | Mod: 25 | Performed by: FAMILY MEDICINE

## 2021-12-01 PROCEDURE — 85027 COMPLETE CBC AUTOMATED: CPT | Performed by: FAMILY MEDICINE

## 2021-12-01 PROCEDURE — 90471 IMMUNIZATION ADMIN: CPT | Performed by: FAMILY MEDICINE

## 2021-12-01 PROCEDURE — 90686 IIV4 VACC NO PRSV 0.5 ML IM: CPT | Performed by: FAMILY MEDICINE

## 2021-12-01 PROCEDURE — 84403 ASSAY OF TOTAL TESTOSTERONE: CPT | Performed by: FAMILY MEDICINE

## 2021-12-01 PROCEDURE — 84443 ASSAY THYROID STIM HORMONE: CPT | Performed by: FAMILY MEDICINE

## 2021-12-01 PROCEDURE — 80053 COMPREHEN METABOLIC PANEL: CPT | Performed by: FAMILY MEDICINE

## 2021-12-01 PROCEDURE — 91300 COVID-19,PF,PFIZER (12+ YRS): CPT | Performed by: FAMILY MEDICINE

## 2021-12-01 PROCEDURE — 36415 COLL VENOUS BLD VENIPUNCTURE: CPT | Performed by: FAMILY MEDICINE

## 2021-12-01 PROCEDURE — 0004A COVID-19,PF,PFIZER (12+ YRS): CPT | Performed by: FAMILY MEDICINE

## 2021-12-01 ASSESSMENT — MIFFLIN-ST. JEOR: SCORE: 1897.98

## 2021-12-01 NOTE — PROGRESS NOTES
"  Assessment & Plan     Cystic acne vulgaris  Check labs   - TSH with free T4 reflex; Future  - CBC with platelets; Future  - Testosterone, total; Future  - Comprehensive metabolic panel (BMP + Alb, Alk Phos, ALT, AST, Total. Bili, TP); Future  - TSH with free T4 reflex  - CBC with platelets  - Testosterone, total  - Comprehensive metabolic panel (BMP + Alb, Alk Phos, ALT, AST, Total. Bili, TP)    Unable to lose weight    - TSH with free T4 reflex; Future  - CBC with platelets; Future  - Testosterone, total; Future  - Comprehensive metabolic panel (BMP + Alb, Alk Phos, ALT, AST, Total. Bili, TP); Future  - TSH with free T4 reflex  - CBC with platelets  - Testosterone, total  - Comprehensive metabolic panel (BMP + Alb, Alk Phos, ALT, AST, Total. Bili, TP)    Need for prophylactic vaccination and inoculation against influen  - INFLUENZA VACCINE IM > 6 MONTHS VALENT IIV4 (AFLURIA/FLUZONE)    High priority for 2019-nCoV vaccine    - COVID-19,PF,PFIZER (12+ Yrs PURPLE LABEL)           BMI:   Estimated body mass index is 30.93 kg/m  as calculated from the following:    Height as of this encounter: 1.772 m (5' 9.75\").    Weight as of this encounter: 97.1 kg (214 lb).   Weight management plan: Patient referred to endocrine and/or weight management specialty    See Patient Instructions    No follow-ups on file.    Caitlyn Gardner MD  [facility] AN Bingham is a 36 year old who presents for the following health issues     HPI     Was with nutritional  for awhile and did not see any results. Recommended to have blood work looked at.   Toenail, left big toe, still having problems. Completed medication given - did not help.     Increased acne, chest, back and face. Increased for the last 4-5 months.   Has not lost any weight despite         Review of Systems   Constitutional, HEENT, cardiovascular, pulmonary, gi and gu systems are negative, except as otherwise noted.      Objective    /82 " "  Pulse 82   Temp 98.2  F (36.8  C) (Tympanic)   Ht 1.772 m (5' 9.75\")   Wt 97.1 kg (214 lb)   SpO2 98%   BMI 30.93 kg/m    Body mass index is 30.93 kg/m .  Physical Exam   GENERAL: healthy, alert and no distress  SKIN: no suspicious lesions or rashes and acne  PSYCH: mentation appears normal, affect normal/bright    No results found for this or any previous visit (from the past 24 hour(s)).    Caitlyn Gardner M.D.          "

## 2021-12-03 LAB — TESTOST SERPL-MCNC: 376 NG/DL (ref 240–950)

## 2021-12-05 NOTE — RESULT ENCOUNTER NOTE
Colt,  Your lab results were normal/stable. Please feel free to my chart or call the office with questions. Caitlyn Gardner M.D.

## 2021-12-07 ENCOUNTER — OFFICE VISIT (OUTPATIENT)
Dept: DERMATOLOGY | Facility: CLINIC | Age: 36
End: 2021-12-07
Payer: COMMERCIAL

## 2021-12-07 VITALS — DIASTOLIC BLOOD PRESSURE: 84 MMHG | SYSTOLIC BLOOD PRESSURE: 130 MMHG | OXYGEN SATURATION: 97 % | HEART RATE: 65 BPM

## 2021-12-07 DIAGNOSIS — L70.0 CYSTIC ACNE VULGARIS: ICD-10-CM

## 2021-12-07 DIAGNOSIS — L70.0 ACNE VULGARIS: Primary | ICD-10-CM

## 2021-12-07 PROCEDURE — 99213 OFFICE O/P EST LOW 20 MIN: CPT | Performed by: PHYSICIAN ASSISTANT

## 2021-12-07 RX ORDER — DOXYCYCLINE 100 MG/1
100 CAPSULE ORAL 2 TIMES DAILY
Qty: 180 CAPSULE | Refills: 0 | Status: SHIPPED | OUTPATIENT
Start: 2021-12-07 | End: 2022-06-16

## 2021-12-07 RX ORDER — CLINDAMYCIN PHOSPHATE 10 UG/ML
LOTION TOPICAL
Qty: 60 ML | Refills: 3 | Status: SHIPPED | OUTPATIENT
Start: 2021-12-07 | End: 2022-06-16

## 2021-12-07 NOTE — LETTER
12/7/2021         RE: Colt Alegria  [address] [address]        Dear Colleague,    Thank you for referring your patient, Colt Alegria, to the [facility]. Please see a copy of my visit note below.    Colt Alegria is an extremely pleasant 36 year old year old male patient here today for cystic acne on face chest and shoulder. He notes that it is worse in the summer. He has been using biore face wash.  Patient has no other skin complaints today.  Remainder of the HPI, Meds, PMH, Allergies, FH, and SH was reviewed in chart.    Pertinent Hx:   Acne Vulgaris   Past Medical History:   Diagnosis Date     Hypertension      Paroxysmal atrial fibrillation (H) 12/27/2016       Past Surgical History:   Procedure Laterality Date     CARDIOVERSION  12/27/16, 10/17/18     HUMERUS SURGERY  2008    Left arm implant     ORTHOPEDIC SURGERY          Family History   Problem Relation Age of Onset     Heart Disease Father      Heart Disease Maternal Grandfather      Hypertension Sister        Social History     Socioeconomic History     Marital status:      Spouse name: Not on file     Number of children: Not on file     Years of education: Not on file     Highest education level: Not on file   Occupational History     Not on file   Tobacco Use     Smoking status: Never Smoker     Smokeless tobacco: Never Used   Substance and Sexual Activity     Alcohol use: Not Currently     Comment: Socially     Drug use: No     Sexual activity: Yes     Partners: Female     Birth control/protection: I.U.D.   Other Topics Concern     Not on file   Social History Narrative     Not on file     Social Determinants of Health     Financial Resource Strain: Not on file   Food Insecurity: Not on file   Transportation Needs: Not on file   Physical Activity: Not on file   Stress: Not on file   Social Connections: Not on file   Intimate Partner Violence: Not on file   Housing Stability: Not on file        Outpatient Encounter Medications as of 12/7/2021   Medication Sig Dispense Refill     clindamycin (CLEOCIN T) 1 % external lotion Apply twice daily to affected on face, chest, and back. 60 mL 3     doxycycline monohydrate (MONODOX) 100 MG capsule Take 1 capsule (100 mg) by mouth 2 times daily 180 capsule 0     diltiazem ER (DILT-XR) 120 MG 24 hr capsule Take 1 capsule (120 mg) by mouth daily (Patient not taking: Reported on 12/7/2021) 90 capsule 3     flecainide (TAMBOCOR) 150 MG tablet Take 3 tablets (450 mg) by mouth as needed (when in AFib longer than 30 minutes. do not exceed more than 3 tablets over 24 hours period) (Patient not taking: Reported on 12/7/2021) 15 tablet 3     itraconazole (SPORANOX) 100 MG capsule Take 2 capsules (200 mg) by mouth daily For 12 weeks (Patient not taking: Reported on 12/7/2021) 90 capsule 0     No facility-administered encounter medications on file as of 12/7/2021.             O:   NAD, WDWN, Alert & Oriented, Mood & Affect wnl, Vitals stable   Here today alone   /84 (BP Location: Right arm, Patient Position: Sitting)   Pulse 65   SpO2 97%    General appearance normal   Vitals stable   Alert, oriented and in no acute distress     2+ inflammatory papules on face, mild on chest, back shoulder       Eyes: Conjunctivae/lids:Normal     ENT: Lip: normal    MSK:Normal    Pulm: Breathing Normal    Neuro/Psych: Orientation:Alert and Orientedx3 ; Mood/Affect:normal  A/P:  1. Acne vulgaris   Use benzoyl peroxide wash, panoxyl wash daily in shower to face, chest, and back.  Apply clindamycin lotion daily to twice daily.   Doxycycline twice daily with food.   Recheck in 3 months.   Consider isotretinoin if not improving.       Again, thank you for allowing me to participate in the care of your patient.        Sincerely,        Odette Vega PA-C

## 2021-12-07 NOTE — PATIENT INSTRUCTIONS
Use benzoyl peroxide wash, panoxyl wash daily in shower to face, chest, and back.  Apply clindamycin lotion daily to twice daily.   Doxycycline twice daily with food.   Recheck in 3 months.

## 2021-12-07 NOTE — NURSING NOTE
"Initial /84 (BP Location: Right arm, Patient Position: Sitting)   Pulse 65   SpO2 97%  Estimated body mass index is 30.93 kg/m  as calculated from the following:    Height as of 12/1/21: 1.772 m (5' 9.75\").    Weight as of 12/1/21: 97.1 kg (214 lb). .      "

## 2021-12-13 NOTE — PROGRESS NOTES
Colt Alegria is an extremely pleasant 36 year old year old male patient here today for cystic acne on face chest and shoulder. He notes that it is worse in the summer. He has been using biore face wash.  Patient has no other skin complaints today.  Remainder of the HPI, Meds, PMH, Allergies, FH, and SH was reviewed in chart.    Pertinent Hx:   Acne Vulgaris   Past Medical History:   Diagnosis Date     Hypertension      Paroxysmal atrial fibrillation (H) 12/27/2016       Past Surgical History:   Procedure Laterality Date     CARDIOVERSION  12/27/16, 10/17/18     HUMERUS SURGERY  2008    Left arm implant     ORTHOPEDIC SURGERY          Family History   Problem Relation Age of Onset     Heart Disease Father      Heart Disease Maternal Grandfather      Hypertension Sister        Social History     Socioeconomic History     Marital status:      Spouse name: Not on file     Number of children: Not on file     Years of education: Not on file     Highest education level: Not on file   Occupational History     Not on file   Tobacco Use     Smoking status: Never Smoker     Smokeless tobacco: Never Used   Substance and Sexual Activity     Alcohol use: Not Currently     Comment: Socially     Drug use: No     Sexual activity: Yes     Partners: Female     Birth control/protection: I.U.D.   Other Topics Concern     Not on file   Social History Narrative     Not on file     Social Determinants of Health     Financial Resource Strain: Not on file   Food Insecurity: Not on file   Transportation Needs: Not on file   Physical Activity: Not on file   Stress: Not on file   Social Connections: Not on file   Intimate Partner Violence: Not on file   Housing Stability: Not on file       Outpatient Encounter Medications as of 12/7/2021   Medication Sig Dispense Refill     clindamycin (CLEOCIN T) 1 % external lotion Apply twice daily to affected on face, chest, and back. 60 mL 3     doxycycline monohydrate (MONODOX) 100 MG capsule  Take 1 capsule (100 mg) by mouth 2 times daily 180 capsule 0     diltiazem ER (DILT-XR) 120 MG 24 hr capsule Take 1 capsule (120 mg) by mouth daily (Patient not taking: Reported on 12/7/2021) 90 capsule 3     flecainide (TAMBOCOR) 150 MG tablet Take 3 tablets (450 mg) by mouth as needed (when in AFib longer than 30 minutes. do not exceed more than 3 tablets over 24 hours period) (Patient not taking: Reported on 12/7/2021) 15 tablet 3     itraconazole (SPORANOX) 100 MG capsule Take 2 capsules (200 mg) by mouth daily For 12 weeks (Patient not taking: Reported on 12/7/2021) 90 capsule 0     No facility-administered encounter medications on file as of 12/7/2021.             O:   NAD, WDWN, Alert & Oriented, Mood & Affect wnl, Vitals stable   Here today alone   /84 (BP Location: Right arm, Patient Position: Sitting)   Pulse 65   SpO2 97%    General appearance normal   Vitals stable   Alert, oriented and in no acute distress     2+ inflammatory papules on face, mild on chest, back shoulder       Eyes: Conjunctivae/lids:Normal     ENT: Lip: normal    MSK:Normal    Pulm: Breathing Normal    Neuro/Psych: Orientation:Alert and Orientedx3 ; Mood/Affect:normal  A/P:  1. Acne vulgaris   Use benzoyl peroxide wash, panoxyl wash daily in shower to face, chest, and back.  Apply clindamycin lotion daily to twice daily.   Doxycycline twice daily with food.   Recheck in 3 months.   Consider isotretinoin if not improving.

## 2022-01-06 ENCOUNTER — OFFICE VISIT (OUTPATIENT)
Dept: FAMILY MEDICINE | Facility: CLINIC | Age: 37
End: 2022-01-06
Payer: COMMERCIAL

## 2022-01-06 VITALS
SYSTOLIC BLOOD PRESSURE: 123 MMHG | TEMPERATURE: 98 F | HEIGHT: 70 IN | OXYGEN SATURATION: 97 % | DIASTOLIC BLOOD PRESSURE: 82 MMHG | RESPIRATION RATE: 14 BRPM | WEIGHT: 216.6 LBS | BODY MASS INDEX: 31.01 KG/M2 | HEART RATE: 72 BPM

## 2022-01-06 DIAGNOSIS — Z30.2 ENCOUNTER FOR STERILIZATION: Primary | ICD-10-CM

## 2022-01-06 PROCEDURE — 99213 OFFICE O/P EST LOW 20 MIN: CPT | Performed by: FAMILY MEDICINE

## 2022-01-06 ASSESSMENT — PAIN SCALES - GENERAL: PAINLEVEL: NO PAIN (0)

## 2022-01-06 ASSESSMENT — MIFFLIN-ST. JEOR: SCORE: 1909.77

## 2022-01-06 NOTE — PROGRESS NOTES
"SUBJECTIVE:                                                    Colt Alegria is a 36 year old male who presents to clinic today for the following health issues:    Chief Complaint   Patient presents with     Consult     -Here for a vasectomy Consult.        OBJECTIVE:                                                    /82   Pulse 72   Temp 98  F (36.7  C) (Tympanic)   Resp 14   Ht 1.772 m (5' 9.75\")   Wt 98.2 kg (216 lb 9.6 oz)   SpO2 97%   BMI 31.30 kg/m   Body mass index is 31.3 kg/m .   SUBJECTIVE:  .lincolnt comes in for a vasectomy consultation.  He and his partner have decided they don't want to have any more children. They have decided that he will have the sterilization procedure instead of her.  Patient was given information to read prior to the consultation.      We talked about the risks and benefits of the vasectomy; risks being that of potential for bleeding, infection, postoperative discomfort immediately which can last for a number of weeks afterwards, also the development of spermatoceles or sperm granulomas, epididymal cysts and the possibility of failure of the procedure producing failed attempt at sterility.     Also mentioned to the patient that there is some literature out there that suggests men who have vasectomies are at increased risk for prostate cancer; however, this literature is inconclusive and controversial.  It is recommended that men who have vasectomies should have annual prostate exams through the rectum yearly after age 40 and also may benefit from a screening prostatic specific antigen test after age 40.  We also discussed signs and symptoms of prostatic enlargement or prostatic problems.     I went through the procedure with the patient, how it is done, a midline incision in the scrotum measuring approximately 1/2 inch in length.  The vas deferens is brought up through this incision, dissected free and a small portion, maybe 0.5 cm. in length is removed.   " cauterized and then the proximal or distal end is buried away from the other.  Patient had no questions when it came to the procedure itself.  I did show him pictures and diagrams of the procedure.  I showed him where a potential spermatocele or sperm granuloma can occur.      Again, the patient had no further questions for me.    OBJECTIVE:  On exam, this is a well-developed, well-nourished white male.      Exam reveals a normal circumcised penis, no lesions.  Testes are descended bilaterally.  Exam was limited to the genitalia.  Both vas deferens were easily identified.  No other abnormalities were noted.      ASSESSMENT:  Undesired fertility in an adult male, requesting vasectomy.    PLAN:  He will schedule a vasectomy at his convenience for either the last appointment of the morning He is aware that he will need to take the entire weekend off and have essentially bedrest for two days with ice packs every two hours and ibuprofen for discomfort.  I gave him a prescription for ibuprofen 800 mg. to take every six to eight hours with food after the procedure.  He will take one tablet half an hour before the procedure along with Valium 10 mg.    If he has any further questions, he will contact me prior to the procedure or ask me on the day of the procedure.  He also is aware that he will need to bring a specimen after 10-12 ejaculations to make sure that he has cleared the storage vesicles of any residual sperm and to make sure that he is sterile.            [facility]

## 2022-02-12 ENCOUNTER — HEALTH MAINTENANCE LETTER (OUTPATIENT)
Age: 37
End: 2022-02-12

## 2022-02-24 ENCOUNTER — TELEPHONE (OUTPATIENT)
Dept: FAMILY MEDICINE | Facility: CLINIC | Age: 37
End: 2022-02-24
Payer: COMMERCIAL

## 2022-02-24 DIAGNOSIS — Z30.2 ENCOUNTER FOR STERILIZATION: Primary | ICD-10-CM

## 2022-02-24 NOTE — TELEPHONE ENCOUNTER
Reason for Call:  3/3/22 Vasectomy - Dr. Soni     Detailed comments: Pt was told to call ahead for Percocet & tranquilizer per pt.  Please Advise.     Phone Number Patient can be reached at: Home number on file [phone] (home)    Best Time: Any Time      Can we leave a detailed message on this number? YES    Call taken on 2/24/2022 at 4:29 PM by Balbina Smith

## 2022-02-26 RX ORDER — OXYCODONE AND ACETAMINOPHEN 5; 325 MG/1; MG/1
1 TABLET ORAL EVERY 6 HOURS PRN
Qty: 12 TABLET | Refills: 0 | Status: SHIPPED | OUTPATIENT
Start: 2022-02-26 | End: 2022-03-01

## 2022-02-26 RX ORDER — DIAZEPAM 10 MG
TABLET ORAL
Qty: 1 TABLET | Refills: 0 | Status: SHIPPED | OUTPATIENT
Start: 2022-02-26 | End: 2022-06-16

## 2022-03-03 ENCOUNTER — OFFICE VISIT (OUTPATIENT)
Dept: FAMILY MEDICINE | Facility: CLINIC | Age: 37
End: 2022-03-03
Payer: COMMERCIAL

## 2022-03-03 VITALS
DIASTOLIC BLOOD PRESSURE: 86 MMHG | TEMPERATURE: 98.2 F | WEIGHT: 216.5 LBS | HEART RATE: 80 BPM | SYSTOLIC BLOOD PRESSURE: 130 MMHG | BODY MASS INDEX: 31.29 KG/M2 | RESPIRATION RATE: 18 BRPM

## 2022-03-03 DIAGNOSIS — Z30.2 ENCOUNTER FOR STERILIZATION: Primary | ICD-10-CM

## 2022-03-03 PROCEDURE — 55250 REMOVAL OF SPERM DUCT(S): CPT | Performed by: FAMILY MEDICINE

## 2022-03-03 PROCEDURE — 99207 PR NO CHARGE LOS: CPT | Mod: 25 | Performed by: FAMILY MEDICINE

## 2022-03-03 ASSESSMENT — PAIN SCALES - GENERAL: PAINLEVEL: NO PAIN (0)

## 2022-03-03 NOTE — NURSING NOTE
"Initial /86 (BP Location: Right arm, Patient Position: Chair, Cuff Size: Adult Large)   Pulse 80   Temp 98.2  F (36.8  C) (Tympanic)   Resp 18   Wt 98.2 kg (216 lb 8 oz)   BMI 31.29 kg/m   Estimated body mass index is 31.29 kg/m  as calculated from the following:    Height as of 1/6/22: 1.772 m (5' 9.75\").    Weight as of this encounter: 98.2 kg (216 lb 8 oz). .    Karina Qiu CMA ([facility])    "

## 2022-03-03 NOTE — PROGRESS NOTES
Assessment & Plan         Colt Alegria is a 36 year old male here for vasectomy.     He has been in previously for vasectomy consult in which we discussed the no-scapel procedure, including risks and benefits, and other options of birth control.  All questions have been answered and the consent form is signed.         Procedure:  He was placed in the supine position on the procedure table.  He was prepped and draped in the usual sterile fashion.  The vas was identified and brought up to the surface.  The skin overlying the vas was anesthetized with lidocaine and further lidocaine injected into the vas sheath.  The no-scaple vas clamp was used to grasp the vas and the dissecting instrument was used to puncture the skin and dissect out the vas free of tissue. A segment of the vas was removed, both ends were burned andclosed with intralumanal electrocaudtery.  The distal end was buried under fascia.  The procedure was repeated for the other vas.  There was no complications.  He tolerated the procedure well.  The scrotum was cleaned free of betadine and bacitracin ointment was applied over the skin incisions.         A:  Vasectomy         P:  He was given post-vasectomy instructions, including   containers for post vas specimens.  He should apply ice   and wear support for the next 2-3 days.  He should  abstain from sexual intercourse and any heavy lifting for the next week.  Call or return to clinic for any  problems.  The excised specimens were sent for pathology.  He may use Ibuprofen 800 mg po TID prn for  pain and was given a prescription for pwercocet..  He is not  considered sterile until follow up vas specimens are free of semen.

## 2022-06-16 ENCOUNTER — OFFICE VISIT (OUTPATIENT)
Dept: FAMILY MEDICINE | Facility: CLINIC | Age: 37
End: 2022-06-16
Payer: COMMERCIAL

## 2022-06-16 VITALS
HEART RATE: 67 BPM | TEMPERATURE: 97.2 F | WEIGHT: 217.1 LBS | DIASTOLIC BLOOD PRESSURE: 94 MMHG | HEIGHT: 70 IN | BODY MASS INDEX: 31.08 KG/M2 | SYSTOLIC BLOOD PRESSURE: 136 MMHG

## 2022-06-16 DIAGNOSIS — N45.1 EPIDIDYMITIS: Primary | ICD-10-CM

## 2022-06-16 PROCEDURE — 99213 OFFICE O/P EST LOW 20 MIN: CPT | Performed by: FAMILY MEDICINE

## 2022-06-16 RX ORDER — CIPROFLOXACIN 500 MG/1
500 TABLET, FILM COATED ORAL 2 TIMES DAILY
Qty: 56 TABLET | Refills: 0 | Status: SHIPPED | OUTPATIENT
Start: 2022-06-16 | End: 2023-05-06

## 2022-06-16 ASSESSMENT — PATIENT HEALTH QUESTIONNAIRE - PHQ9
SUM OF ALL RESPONSES TO PHQ QUESTIONS 1-9: 0
10. IF YOU CHECKED OFF ANY PROBLEMS, HOW DIFFICULT HAVE THESE PROBLEMS MADE IT FOR YOU TO DO YOUR WORK, TAKE CARE OF THINGS AT HOME, OR GET ALONG WITH OTHER PEOPLE: NOT DIFFICULT AT ALL
SUM OF ALL RESPONSES TO PHQ QUESTIONS 1-9: 0

## 2022-06-16 ASSESSMENT — PAIN SCALES - GENERAL: PAINLEVEL: NO PAIN (0)

## 2022-06-16 NOTE — PROGRESS NOTES
SUBJECTIVE:                                                    Colt Alegria is a 36 year old male who presents to clinic today for the following health issues:    Answers for HPI/ROS submitted by the patient on 6/16/2022  If you checked off any problems, how difficult have these problems made it for you to do your work, take care of things at home, or get along with other people?: Not difficult at all  PHQ9 TOTAL SCORE: 0  What is the reason for your visit today? : Vasectomy follow-up  How many servings of fruits and vegetables do you eat daily?: 2-3  On average, how many sweetened beverages do you drink each day (Examples: soda, juice, sweet tea, etc.  Do NOT count diet or artificially sweetened beverages)?: 0  How many minutes a day do you exercise enough to make your heart beat faster?: 30 to 60  How many days a week do you exercise enough to make your heart beat faster?: 5  How many days per week do you miss taking your medication?: 0      Problem list and histories reviewed & adjusted, as indicated.  Additional history:     Patient Active Problem List   Diagnosis     CARDIOVASCULAR SCREENING; LDL GOAL LESS THAN 160     Paroxysmal atrial fibrillation (H)     Essential hypertension     Past Surgical History:   Procedure Laterality Date     CARDIOVERSION  12/27/16, 10/17/18     HUMERUS SURGERY  2008    Left arm implant     ORTHOPEDIC SURGERY         Social History     Tobacco Use     Smoking status: Never Smoker     Smokeless tobacco: Never Used   Substance Use Topics     Alcohol use: Not Currently     Comment: Socially     Family History   Problem Relation Age of Onset     Heart Disease Father      Heart Disease Maternal Grandfather      Hypertension Sister          Current Outpatient Medications   Medication Sig Dispense Refill     ciprofloxacin (CIPRO) 500 MG tablet Take 1 tablet (500 mg) by mouth 2 times daily 56 tablet 0     No Known Allergies  Recent Labs   Lab Test 12/01/21  1432 08/10/19  1128  "10/17/18  1305   ALT 41 58  --    CR 1.09 0.82 0.99   GFRESTIMATED 86 >90 87   GFRESTBLACK  --  >90 >90   POTASSIUM 4.1 4.1 3.6   TSH 1.12 1.32 1.56        ROS:  Constitutional, HEENT, cardiovascular, pulmonary, gi and gu systems are negative, except as otherwise noted.    OBJECTIVE:                                                    BP (!) 136/94   Pulse 67   Temp 97.2  F (36.2  C) (Tympanic)   Ht 1.772 m (5' 9.75\")   Wt 98.5 kg (217 lb 1.6 oz)   BMI 31.37 kg/m   Body mass index is 31.37 kg/m .   GENERAL: healthy, alert, well nourished, well hydrated, no distress  HENT: ear canals- normal; TMs- normal; Nose- normal; Mouth- no ulcers, no lesions  NECK: no tenderness, no adenopathy, no asymmetry, no masses, no stiffness; thyroid- normal to palpation  RESP: lungs clear to auscultation - no rales, no rhonchi, no wheezes  CV: regular rates and rhythm, normal S1 S2, no S3 or S4 and no murmur, no click or rub -  ABDOMEN: soft, no tenderness, no  hepatosplenomegaly, no masses, normal bowel sounds  : has tenderness right epididmuss and fullness     ASSESSMENT/PLAN:                                                      (N45.1) Epididymitis  (primary encounter diagnosis)    Plan: ciprofloxacin (CIPRO) 500 MG tablet  return to clinic, or call if unimproved in 2-4 weeks sooner if worse.      3     reports that he has never smoked. He has never used smokeless tobacco.      Weight management plan: Discussed healthy diet and exercise guidelines      [facility]    "

## 2022-10-10 ENCOUNTER — HEALTH MAINTENANCE LETTER (OUTPATIENT)
Age: 37
End: 2022-10-10

## 2023-03-25 ENCOUNTER — HEALTH MAINTENANCE LETTER (OUTPATIENT)
Age: 38
End: 2023-03-25

## 2023-05-06 ENCOUNTER — HOSPITAL ENCOUNTER (EMERGENCY)
Facility: CLINIC | Age: 38
Discharge: HOME | End: 2023-05-06
Attending: EMERGENCY MEDICINE | Admitting: EMERGENCY MEDICINE
Payer: COMMERCIAL

## 2023-05-06 VITALS
OXYGEN SATURATION: 97 % | SYSTOLIC BLOOD PRESSURE: 123 MMHG | TEMPERATURE: 96.9 F | WEIGHT: 205 LBS | BODY MASS INDEX: 29.35 KG/M2 | RESPIRATION RATE: 19 BRPM | DIASTOLIC BLOOD PRESSURE: 84 MMHG | HEART RATE: 89 BPM | HEIGHT: 70 IN

## 2023-05-06 DIAGNOSIS — I48.91 ATRIAL FIBRILLATION WITH RAPID VENTRICULAR RESPONSE (H): ICD-10-CM

## 2023-05-06 DIAGNOSIS — I48.0 PAROXYSMAL ATRIAL FIBRILLATION (H): ICD-10-CM

## 2023-05-06 LAB
ANION GAP SERPL CALCULATED.3IONS-SCNC: 10 MMOL/L (ref 7–15)
BASOPHILS # BLD AUTO: 0 10E3/UL (ref 0–0.2)
BASOPHILS NFR BLD AUTO: 1 %
BUN SERPL-MCNC: 11 MG/DL (ref 6–20)
CALCIUM SERPL-MCNC: 8.9 MG/DL (ref 8.6–10)
CHLORIDE SERPL-SCNC: 104 MMOL/L (ref 98–107)
CREAT SERPL-MCNC: 0.82 MG/DL (ref 0.67–1.17)
DEPRECATED HCO3 PLAS-SCNC: 25 MMOL/L (ref 22–29)
EOSINOPHIL # BLD AUTO: 0 10E3/UL (ref 0–0.7)
EOSINOPHIL NFR BLD AUTO: 1 %
ERYTHROCYTE [DISTWIDTH] IN BLOOD BY AUTOMATED COUNT: 12.1 % (ref 10–15)
GFR SERPL CREATININE-BSD FRML MDRD: >90 ML/MIN/1.73M2
GLUCOSE SERPL-MCNC: 84 MG/DL (ref 70–99)
HCT VFR BLD AUTO: 43.2 % (ref 40–53)
HGB BLD-MCNC: 15.1 G/DL (ref 13.3–17.7)
HOLD SPECIMEN: NORMAL
HOLD SPECIMEN: NORMAL
IMM GRANULOCYTES # BLD: 0 10E3/UL
IMM GRANULOCYTES NFR BLD: 0 %
LYMPHOCYTES # BLD AUTO: 1.2 10E3/UL (ref 0.8–5.3)
LYMPHOCYTES NFR BLD AUTO: 29 %
MCH RBC QN AUTO: 32.1 PG (ref 26.5–33)
MCHC RBC AUTO-ENTMCNC: 35 G/DL (ref 31.5–36.5)
MCV RBC AUTO: 92 FL (ref 78–100)
MONOCYTES # BLD AUTO: 0.5 10E3/UL (ref 0–1.3)
MONOCYTES NFR BLD AUTO: 12 %
NEUTROPHILS # BLD AUTO: 2.5 10E3/UL (ref 1.6–8.3)
NEUTROPHILS NFR BLD AUTO: 57 %
NRBC # BLD AUTO: 0 10E3/UL
NRBC BLD AUTO-RTO: 0 /100
PLATELET # BLD AUTO: 232 10E3/UL (ref 150–450)
POTASSIUM SERPL-SCNC: 4.3 MMOL/L (ref 3.4–5.3)
RBC # BLD AUTO: 4.71 10E6/UL (ref 4.4–5.9)
SODIUM SERPL-SCNC: 139 MMOL/L (ref 136–145)
WBC # BLD AUTO: 4.3 10E3/UL (ref 4–11)

## 2023-05-06 PROCEDURE — 99284 EMERGENCY DEPT VISIT MOD MDM: CPT | Mod: 25 | Performed by: EMERGENCY MEDICINE

## 2023-05-06 PROCEDURE — 85025 COMPLETE CBC W/AUTO DIFF WBC: CPT | Performed by: EMERGENCY MEDICINE

## 2023-05-06 PROCEDURE — 250N000013 HC RX MED GY IP 250 OP 250 PS 637: Performed by: EMERGENCY MEDICINE

## 2023-05-06 PROCEDURE — 93005 ELECTROCARDIOGRAM TRACING: CPT | Performed by: EMERGENCY MEDICINE

## 2023-05-06 PROCEDURE — 36415 COLL VENOUS BLD VENIPUNCTURE: CPT | Performed by: EMERGENCY MEDICINE

## 2023-05-06 PROCEDURE — 82310 ASSAY OF CALCIUM: CPT | Performed by: EMERGENCY MEDICINE

## 2023-05-06 PROCEDURE — 93010 ELECTROCARDIOGRAM REPORT: CPT | Performed by: EMERGENCY MEDICINE

## 2023-05-06 PROCEDURE — 96374 THER/PROPH/DIAG INJ IV PUSH: CPT | Performed by: EMERGENCY MEDICINE

## 2023-05-06 PROCEDURE — 250N000011 HC RX IP 250 OP 636: Performed by: EMERGENCY MEDICINE

## 2023-05-06 PROCEDURE — 99284 EMERGENCY DEPT VISIT MOD MDM: CPT | Performed by: EMERGENCY MEDICINE

## 2023-05-06 PROCEDURE — 96376 TX/PRO/DX INJ SAME DRUG ADON: CPT | Performed by: EMERGENCY MEDICINE

## 2023-05-06 RX ORDER — DILTIAZEM HYDROCHLORIDE 5 MG/ML
15 INJECTION INTRAVENOUS ONCE
Status: COMPLETED | OUTPATIENT
Start: 2023-05-06 | End: 2023-05-06

## 2023-05-06 RX ORDER — DILTIAZEM HYDROCHLORIDE 90 MG/1
90 CAPSULE, EXTENDED RELEASE ORAL 2 TIMES DAILY
Status: DISCONTINUED | OUTPATIENT
Start: 2023-05-06 | End: 2023-05-06 | Stop reason: HOSPADM

## 2023-05-06 RX ORDER — FLECAINIDE ACETATE 150 MG/1
450 TABLET ORAL EVERY 12 HOURS PRN
Qty: 10 TABLET | Refills: 0 | Status: SHIPPED | OUTPATIENT
Start: 2023-05-06 | End: 2023-05-22

## 2023-05-06 RX ORDER — DILTIAZEM HYDROCHLORIDE 5 MG/ML
25 INJECTION INTRAVENOUS ONCE
Status: COMPLETED | OUTPATIENT
Start: 2023-05-06 | End: 2023-05-06

## 2023-05-06 RX ORDER — DILTIAZEM HYDROCHLORIDE 90 MG/1
90 CAPSULE, EXTENDED RELEASE ORAL 2 TIMES DAILY
Qty: 60 CAPSULE | Refills: 0 | Status: SHIPPED | OUTPATIENT
Start: 2023-05-06 | End: 2023-05-22

## 2023-05-06 RX ADMIN — DILTIAZEM HYDROCHLORIDE 90 MG: 90 CAPSULE, EXTENDED RELEASE ORAL at 09:50

## 2023-05-06 RX ADMIN — DILTIAZEM HYDROCHLORIDE 25 MG: 5 INJECTION, SOLUTION INTRAVENOUS at 08:41

## 2023-05-06 RX ADMIN — DILTIAZEM HYDROCHLORIDE 15 MG: 5 INJECTION, SOLUTION INTRAVENOUS at 09:49

## 2023-05-06 ASSESSMENT — ACTIVITIES OF DAILY LIVING (ADL)
ADLS_ACUITY_SCORE: 35
ADLS_ACUITY_SCORE: 35

## 2023-05-06 NOTE — ED TRIAGE NOTES
Pt here with concerns for going into a-fib about 2030 last night. Pt no longer has medcations for this. Pt c/o intermittent chest pains and shortness of breath.      Triage Assessment     Row Name 05/06/23 0738       Triage Assessment (Adult)    Airway WDL WDL       Respiratory WDL    Respiratory WDL all    Rhythm/Pattern, Respiratory shortness of breath       Skin Circulation/Temperature WDL    Skin Circulation/Temperature WDL WDL       Cardiac WDL    Cardiac WDL all    Cardiac Rhythm Atrial fibrillation       Peripheral/Neurovascular WDL    Peripheral Neurovascular WDL WDL       Cognitive/Neuro/Behavioral WDL    Cognitive/Neuro/Behavioral WDL WDL

## 2023-05-06 NOTE — DISCHARGE INSTRUCTIONS
Take diltiazem 90 mg twice daily as prescribed    Take aspirin 81 mg daily    Follow-up with Dr. Deras cardiology    Return here for chest pain shortness of air passing out or other concern

## 2023-05-06 NOTE — ED PROVIDER NOTES
History     Chief Complaint   Patient presents with     Palpitations     HPI  Colt Alegria is a 37 year old male who presents with atrial fibrillation, history of paroxysmal atrial fibrillation in the past.  Has taken flecainide successfully but is out of the same.  Seen by Dr. Deras electrophysiology 2019, note reviewed from that visit as well as echocardiogram.  No valvular disease no atrial enlargement.  Patient is taking no current medications.  He denies recent febrile illness, he does describe cough and congestion with allergies and sneezed twice and coughed violently last evening at about 2030 after which time he noted tachycardia and irregular heartbeat.  He describes dyspnea on exertion denies chest pain denies near syncope.  He does not use nicotine, alcohol or illicit substances.  He has a diagnosis of essential hypertension, although reports that diltiazem was discontinued secondary to normotensive readings.  [id number]-GWEq 2 score is 0 today.  Allergies:  No Known Allergies    Problem List:    Patient Active Problem List    Diagnosis Date Noted     Essential hypertension 08/14/2019     Priority: Medium     Paroxysmal atrial fibrillation (H)      Priority: Medium     CARDIOVASCULAR SCREENING; LDL GOAL LESS THAN 160 03/05/2014     Priority: Medium        Past Medical History:    Past Medical History:   Diagnosis Date     Hypertension      Paroxysmal atrial fibrillation (H) 12/27/2016       Past Surgical History:    Past Surgical History:   Procedure Laterality Date     CARDIOVERSION  12/27/16, 10/17/18     HUMERUS SURGERY  2008    Left arm implant     ORTHOPEDIC SURGERY         Family History:    Family History   Problem Relation Age of Onset     Heart Disease Father      Heart Disease Maternal Grandfather      Hypertension Sister        Social History:  Marital Status:   [2]  Social History     Tobacco Use     Smoking status: Never     Smokeless tobacco: Never   Substance Use Topics     Alcohol  "use: Not Currently     Comment: Socially     Drug use: No        Medications:    diltiazem ER (CARDIZEM SR) 90 MG 12 hr capsule  flecainide (TAMBOCOR) 150 MG tablet          Review of Systems  All other systems reviewed and are negative.    Physical Exam   BP: 129/82  Pulse: 92  Temp: 96.9  F (36.1  C)  Resp: 16  Height: 177.8 cm (5' 10\")  Weight: 93 kg (205 lb)  SpO2: 98 %      Physical Exam  Nontoxic appearing no respiratory distress alert and oriented ×3  Head atraumatic normocephalic   Neck supple full active painless range of motion  Lungs clear to auscultation  Heart irregularly irregular tachycardic  Abdomen soft nontender bowel sounds positive   Strength and sensation grossly intact throughout the extremities, gait and station normal  Speech is fluent, good eye contact, thought processes are rational  Lower extremities without swelling, redness or tenderness  Pedal pulses symmetrical and strong    ED Course          EKG time 749, atrial fibrillation rate 109, axes intervals otherwise within normal limits no acute ST-T wave changes no Q waves, read by Dr. Brayden Alegria                Results for orders placed or performed during the hospital encounter of 05/06/23 (from the past 24 hour(s))   CBC with platelets differential    Narrative    The following orders were created for panel order CBC with platelets differential.  Procedure                               Abnormality         Status                     ---------                               -----------         ------                     CBC with platelets and d...[[id number]]                      Final result                 Please view results for these tests on the individual orders.   Basic metabolic panel   Result Value Ref Range    Sodium 139 136 - 145 mmol/L    Potassium 4.3 3.4 - 5.3 mmol/L    Chloride 104 98 - 107 mmol/L    Carbon Dioxide (CO2) 25 22 - 29 mmol/L    Anion Gap 10 7 - 15 mmol/L    Urea Nitrogen 11.0 6.0 - 20.0 mg/dL    Creatinine " 0.82 0.67 - 1.17 mg/dL    Calcium 8.9 8.6 - 10.0 mg/dL    Glucose 84 70 - 99 mg/dL    GFR Estimate >90 >60 mL/min/1.73m2   CBC with platelets and differential   Result Value Ref Range    WBC Count 4.3 4.0 - 11.0 10e3/uL    RBC Count 4.71 4.40 - 5.90 10e6/uL    Hemoglobin 15.1 13.3 - 17.7 g/dL    Hematocrit 43.2 40.0 - 53.0 %    MCV 92 78 - 100 fL    MCH 32.1 26.5 - 33.0 pg    MCHC 35.0 31.5 - 36.5 g/dL    RDW 12.1 10.0 - 15.0 %    Platelet Count 232 150 - 450 10e3/uL    % Neutrophils 57 %    % Lymphocytes 29 %    % Monocytes 12 %    % Eosinophils 1 %    % Basophils 1 %    % Immature Granulocytes 0 %    NRBCs per 100 WBC 0 <1 /100    Absolute Neutrophils 2.5 1.6 - 8.3 10e3/uL    Absolute Lymphocytes 1.2 0.8 - 5.3 10e3/uL    Absolute Monocytes 0.5 0.0 - 1.3 10e3/uL    Absolute Eosinophils 0.0 0.0 - 0.7 10e3/uL    Absolute Basophils 0.0 0.0 - 0.2 10e3/uL    Absolute Immature Granulocytes 0.0 <=0.4 10e3/uL    Absolute NRBCs 0.0 10e3/uL   Solo Draw    Narrative    The following orders were created for panel order Solo Draw.  Procedure                               Abnormality         Status                     ---------                               -----------         ------                     Extra Red Top Tube[[id number]]                               Final result               Extra Blood Bank Purple ...[[id number]]                                                   Please view results for these tests on the individual orders.   Extra Red Top Tube   Result Value Ref Range    Hold Specimen JIC    Solo Draw    Narrative    The following orders were created for panel order Solo Draw.  Procedure                               Abnormality         Status                     ---------                               -----------         ------                     Extra Blue Top Tube[[id number]]                              Final result                 Please view results for these tests on the individual orders.    Extra Blue Top Tube   Result Value Ref Range    Hold Specimen JIC        Medications   diltiazem ER (CARDIZEM SR) 12 hr capsule 90 mg (90 mg Oral $Given 5/6/23 0202)   diltiazem (CARDIZEM) injection 25 mg (25 mg Intravenous $Given 5/6/23 0841)   diltiazem (CARDIZEM) injection 15 mg (15 mg Intravenous $Given 5/6/23 0904)       COFEX-6-Pptu Score  (calculator)  Background  Calculates overall risk of atrial fibrillation related CVA based on 7 factors: Age>=65-75, CHF, Htn, CVA/TIA, DM, vascular disease, female  Data  37 year old year old male  has CARDIOVASCULAR SCREENING; LDL GOAL LESS THAN 160; Paroxysmal atrial fibrillation (H); and Essential hypertension on their problem list.  Criteria   Of possible 6 points for 5 possible items  NEGATIVE    Interpretation  AVJOX-9-Cryl Score: 0  CHADS Score 0: Aspirin 81 to 325 mg daily          Assessments & Plan (with Medical Decision Making)  37-year-old male with a history of paroxysmal atrial fibrillation, has seen electrophysiology in the past details per HPI.  Usual differential considered with respect to atrial fibrillation.  No reversible condition at this time.  Diltiazem 2 doses IV and p.o. dose of 90 mg SR heart rate in the 80-90 range, tolerated ambulation about the department without desaturation, significant respiratory distress or tachycardia.  He is safe for discharge to home.  [id number] score of 0, recommend aspirin 81 mg daily, diltiazem SR 90 mg twice daily.  Flecainide prescription.  Call Dr. Deras electrophysiology on Monday.  Rest, return criteria reviewed     I have reviewed the nursing notes.    I have reviewed the findings, diagnosis, plan and need for follow up with the patient.          Discharge Medication List as of 5/6/2023 11:44 AM      START taking these medications    Details   diltiazem ER (CARDIZEM SR) 90 MG 12 hr capsule Take 1 capsule (90 mg) by mouth 2 times daily, Disp-60 capsule, R-0, E-Prescribe      flecainide (TAMBOCOR) 150 MG  tablet Take 3 tablets (450 mg) by mouth every 12 hours as needed, Disp-10 tablet, R-0, E-Prescribe             Final diagnoses:   Paroxysmal atrial fibrillation (H)   Atrial fibrillation with rapid ventricular response (H)       5/6/2023   [facility] EMERGENCY DEPT     Brayden Alegria MD  05/06/23 1762

## 2023-05-06 NOTE — ED NOTES
Walked pt. Two laps around the ED. Pt started with SpO2 of 99% and BPM of 70. Half way around first lap pts Sp02 dropped to 95% BPM went up to 114 and stayed there until we got down walking. Pt. Stated they felt mildly short of breath towards the end.

## 2023-05-13 ENCOUNTER — TELEPHONE (OUTPATIENT)
Dept: NURSING | Facility: CLINIC | Age: 38
End: 2023-05-13
Payer: COMMERCIAL

## 2023-05-22 ENCOUNTER — OFFICE VISIT (OUTPATIENT)
Dept: FAMILY MEDICINE | Facility: CLINIC | Age: 38
End: 2023-05-22
Payer: COMMERCIAL

## 2023-05-22 VITALS
SYSTOLIC BLOOD PRESSURE: 138 MMHG | BODY MASS INDEX: 29.19 KG/M2 | TEMPERATURE: 98.9 F | RESPIRATION RATE: 14 BRPM | HEIGHT: 70 IN | HEART RATE: 93 BPM | DIASTOLIC BLOOD PRESSURE: 102 MMHG | WEIGHT: 203.9 LBS | OXYGEN SATURATION: 96 %

## 2023-05-22 DIAGNOSIS — Z11.59 NEED FOR HEPATITIS C SCREENING TEST: ICD-10-CM

## 2023-05-22 DIAGNOSIS — Z13.6 CARDIOVASCULAR SCREENING; LDL GOAL LESS THAN 160: Primary | ICD-10-CM

## 2023-05-22 DIAGNOSIS — Z11.4 SCREENING FOR HIV (HUMAN IMMUNODEFICIENCY VIRUS): ICD-10-CM

## 2023-05-22 DIAGNOSIS — I48.0 PAROXYSMAL ATRIAL FIBRILLATION (H): ICD-10-CM

## 2023-05-22 PROCEDURE — 99214 OFFICE O/P EST MOD 30 MIN: CPT | Mod: 25 | Performed by: FAMILY MEDICINE

## 2023-05-22 PROCEDURE — 90715 TDAP VACCINE 7 YRS/> IM: CPT | Performed by: FAMILY MEDICINE

## 2023-05-22 PROCEDURE — 90471 IMMUNIZATION ADMIN: CPT | Performed by: FAMILY MEDICINE

## 2023-05-22 RX ORDER — DILTIAZEM HYDROCHLORIDE 90 MG/1
90 CAPSULE, EXTENDED RELEASE ORAL 2 TIMES DAILY
Qty: 60 CAPSULE | Refills: 0 | Status: SHIPPED | OUTPATIENT
Start: 2023-05-22 | End: 2023-05-22

## 2023-05-22 RX ORDER — DILTIAZEM HYDROCHLORIDE 90 MG/1
90 CAPSULE, EXTENDED RELEASE ORAL 2 TIMES DAILY
Qty: 180 CAPSULE | Refills: 3 | Status: SHIPPED | OUTPATIENT
Start: 2023-05-22 | End: 2024-02-19

## 2023-05-22 RX ORDER — FLECAINIDE ACETATE 150 MG/1
150 TABLET ORAL DAILY PRN
Qty: 10 TABLET | Refills: 0 | COMMUNITY
Start: 2023-05-22

## 2023-05-22 ASSESSMENT — PAIN SCALES - GENERAL: PAINLEVEL: NO PAIN (0)

## 2023-05-22 NOTE — PROGRESS NOTES
"  Assessment & Plan       CARDIOVASCULAR SCREENING; LDL GOAL LESS THAN 160    - Lipid panel reflex to direct LDL Non-fasting; Future    Paroxysmal atrial fibrillation (H)  Would increase cardizem as bp high and pulse in 90's  - flecainide (TAMBOCOR) 150 MG tablet; Take 1 tablet (150 mg) by mouth daily as needed (a fib)  - Basic metabolic panel  (Ca, Cl, CO2, Creat, Gluc, K, Na, BUN); Future  - diltiazem ER (CARDIZEM SR) 90 MG 12 hr capsule; Take 1 capsule (90 mg) by mouth 2 times daily    Recheck in 2 weeks.        MED REC REQUIRED  Post Medication Reconciliation Status:   BMI:   Estimated body mass index is 29.26 kg/m  as calculated from the following:    Height as of this encounter: 1.778 m (5' 10\").    Weight as of this encounter: 92.5 kg (203 lb 14.4 oz).   Weight management plan: Discussed healthy diet and exercise guidelines        Giovanni Soni MD  [facility] Bingham is a 37 year old, presenting for the following health issues:  ER F/U         View : No data to display.              HPI     ED/UC Followup:    Facility:  [facility]  Date of visit: 05/06/2023  Reason for visit: Palpitations  Current Status: He says he has not had any palpitations since being in the ER.    Answers for HPI/ROS submitted by the patient on 5/22/2023  If you checked off any problems, how difficult have these problems made it for you to do your work, take care of things at home, or get along with other people?: Not difficult at all  PHQ9 TOTAL SCORE: 0      Review of Systems   Constitutional, HEENT, cardiovascular, pulmonary, gi and gu systems are negative, except as otherwise noted.      Objective    BP (!) 138/102   Pulse 93   Temp 98.9  F (37.2  C) (Tympanic)   Resp 14   Ht 1.778 m (5' 10\")   Wt 92.5 kg (203 lb 14.4 oz)   SpO2 96%   BMI 29.26 kg/m    Body mass index is 29.26 kg/m .  Physical Exam   GENERAL: healthy, alert and no distress  NECK: no adenopathy, no " asymmetry, masses, or scars and thyroid normal to palpation  RESP: lungs clear to auscultation - no rales, rhonchi or wheezes  CV: regular rate and rhythm, normal S1 S2, no S3 or S4, no murmur, click or rub, no peripheral edema and peripheral pulses strong  ABDOMEN: soft, nontender, no hepatosplenomegaly, no masses and bowel sounds normal  MS: no gross musculoskeletal defects noted, no edema

## 2023-09-11 ENCOUNTER — VIRTUAL VISIT (OUTPATIENT)
Dept: FAMILY MEDICINE | Facility: CLINIC | Age: 38
End: 2023-09-11
Payer: COMMERCIAL

## 2023-09-11 DIAGNOSIS — F33.9 EPISODE OF RECURRENT MAJOR DEPRESSIVE DISORDER, UNSPECIFIED DEPRESSION EPISODE SEVERITY (H): ICD-10-CM

## 2023-09-11 DIAGNOSIS — Z11.4 SCREENING FOR HIV (HUMAN IMMUNODEFICIENCY VIRUS): Primary | ICD-10-CM

## 2023-09-11 DIAGNOSIS — Z11.59 NEED FOR HEPATITIS C SCREENING TEST: ICD-10-CM

## 2023-09-11 PROCEDURE — 99213 OFFICE O/P EST LOW 20 MIN: CPT | Mod: VID | Performed by: FAMILY MEDICINE

## 2023-09-11 RX ORDER — ESCITALOPRAM OXALATE 10 MG/1
10 TABLET ORAL DAILY
Qty: 90 TABLET | Refills: 1 | Status: SHIPPED | OUTPATIENT
Start: 2023-09-11 | End: 2024-03-04

## 2023-09-11 RX ORDER — FLUOXETINE 10 MG/1
CAPSULE ORAL
Qty: 60 CAPSULE | Status: CANCELLED | OUTPATIENT
Start: 2023-09-11

## 2023-09-11 ASSESSMENT — ANXIETY QUESTIONNAIRES
2. NOT BEING ABLE TO STOP OR CONTROL WORRYING: MORE THAN HALF THE DAYS
GAD7 TOTAL SCORE: 13
7. FEELING AFRAID AS IF SOMETHING AWFUL MIGHT HAPPEN: NEARLY EVERY DAY
5. BEING SO RESTLESS THAT IT IS HARD TO SIT STILL: SEVERAL DAYS
6. BECOMING EASILY ANNOYED OR IRRITABLE: MORE THAN HALF THE DAYS
GAD7 TOTAL SCORE: 13
IF YOU CHECKED OFF ANY PROBLEMS ON THIS QUESTIONNAIRE, HOW DIFFICULT HAVE THESE PROBLEMS MADE IT FOR YOU TO DO YOUR WORK, TAKE CARE OF THINGS AT HOME, OR GET ALONG WITH OTHER PEOPLE: SOMEWHAT DIFFICULT
1. FEELING NERVOUS, ANXIOUS, OR ON EDGE: MORE THAN HALF THE DAYS
3. WORRYING TOO MUCH ABOUT DIFFERENT THINGS: SEVERAL DAYS

## 2023-09-11 ASSESSMENT — PATIENT HEALTH QUESTIONNAIRE - PHQ9
5. POOR APPETITE OR OVEREATING: MORE THAN HALF THE DAYS
SUM OF ALL RESPONSES TO PHQ QUESTIONS 1-9: 12

## 2023-09-11 NOTE — PROGRESS NOTES
Zachery is a 37 year old who is being evaluated via a billable video visit.      How would you like to obtain your AVS? MyChart  If the video visit is dropped, the invitation should be resent by: Text to cell phone: [phone]  Will anyone else be joining your video visit? No        Assessment & Plan     Episode of recurrent major depressive disorder, unspecified depression episode severity (H)  Working with marriage counseler.  Had bonge drinking.  Has restarted AA meetings. Sober now for 4 days  - escitalopram (LEXAPRO) 10 MG tablet; Take 1 tablet (10 mg) by mouth daily  Want to restart medicatin for depression.  Prozac made him feel numb    Recheck in 1 week       Depression Screening Follow Up        9/11/2023     5:11 PM   PHQ   PHQ-9 Total Score 12   Q9: Thoughts of better off dead/self-harm past 2 weeks Not at all         Follow Up Actions Taken  Crisis resource information provided in After Visit Summary         Giovanni Soni MD  [facility] ANSUPA Bingham is a 37 year old, presenting for the following health issues:  Recheck Medication (/)      9/11/2023     5:09 PM   Additional Questions   Roomed by Sharron Croft CMA       History of Present Illness       Mental Health Follow-up:  Patient presents to follow-up on Depression.Patient's depression since last visit has been:  Worse  The patient is not having other symptoms associated with depression.      Any significant life events: relationship concerns  Patient is not feeling anxious or having panic attacks.  Patient has no concerns about alcohol or drug use.    He eats 2-3 servings of fruits and vegetables daily.He consumes 1 sweetened beverage(s) daily.He exercises with enough effort to increase his heart rate 30 to 60 minutes per day.  He exercises with enough effort to increase his heart rate 4 days per week.   He is taking medications regularly.      9/11/2023  5:11 PM   Last PHQ-9    1. Little interest or pleasure in doing things  2    2. Feeling down, depressed, or hopeless 2    3. Trouble falling or staying asleep, or sleeping too much 3    4. Feeling tired or having little energy 1    5. Poor appetite or overeating 0    6. Feeling bad about yourself 3    7. Trouble concentrating 1    8. Moving slowly or restless 0    Q9: Thoughts of better off dead/self-harm past 2 weeks 0    PHQ-9 Total Score 12    Difficulty at work, home, or with people Somewhat difficult         9/11/2023  5:11 PM   ZAIRA-7 SCORE    Total Score    Total Score 13                Review of Systems   Constitutional, HEENT, cardiovascular, pulmonary, gi and gu systems are negative, except as otherwise noted.      Objective           Vitals:  No vitals were obtained today due to virtual visit.    Physical Exam   GENERAL: Healthy, alert and no distress  EYES: Eyes grossly normal to inspection.  No discharge or erythema, or obvious scleral/conjunctival abnormalities.  RESP: No audible wheeze, cough, or visible cyanosis.  No visible retractions or increased work of breathing.    SKIN: Visible skin clear. No significant rash, abnormal pigmentation or lesions.  NEURO: Cranial nerves grossly intact.  Mentation and speech appropriate for age.  PSYCH: Mentation appears normal, affect normal/bright, judgement and insight intact, normal speech          Video-Visit Details    Type of service:  Video Visit     Originating Location (pt. Location): Home    Distant Location (provider location):  On-site  Platform used for Video Visit: AmWell  Has poor connect was switched to tele visit midway.

## 2023-12-14 ENCOUNTER — TELEPHONE (OUTPATIENT)
Dept: FAMILY MEDICINE | Facility: CLINIC | Age: 38
End: 2023-12-14
Payer: COMMERCIAL

## 2023-12-14 NOTE — TELEPHONE ENCOUNTER
Patient Quality Outreach    Patient is due for the following:   Hypertension -  BP check  Depression  -  DAP  Physical Preventive Adult Physical      Topic Date Due    Hepatitis B Vaccine (1 of 3 - 3-dose series) Never done    Pneumococcal Vaccine (1 - PCV) Never done    Flu Vaccine (1) 09/01/2023    COVID-19 Vaccine (4 - 2023-24 season) 09/01/2023       Next Steps:   Schedule a Adult Preventative and BP check    Type of outreach:    Phone, spoke to patient/parent. Appointment scheduled 12/28/2023.      Questions for provider review:    None           Sharron Croft  Chart routed to self.

## 2023-12-23 ENCOUNTER — MYC REFILL (OUTPATIENT)
Dept: FAMILY MEDICINE | Facility: CLINIC | Age: 38
End: 2023-12-23
Payer: COMMERCIAL

## 2023-12-23 DIAGNOSIS — F33.9 EPISODE OF RECURRENT MAJOR DEPRESSIVE DISORDER, UNSPECIFIED DEPRESSION EPISODE SEVERITY (H): ICD-10-CM

## 2023-12-23 RX ORDER — ESCITALOPRAM OXALATE 10 MG/1
10 TABLET ORAL DAILY
Qty: 90 TABLET | Refills: 1 | Status: CANCELLED | OUTPATIENT
Start: 2023-12-23

## 2024-01-30 ENCOUNTER — TELEPHONE (OUTPATIENT)
Dept: FAMILY MEDICINE | Facility: CLINIC | Age: 39
End: 2024-01-30
Payer: COMMERCIAL

## 2024-01-30 NOTE — TELEPHONE ENCOUNTER
Patient Quality Outreach    Patient is due for the following:   Hypertension -  BP check  Depression  -  PHQ-9 needed, Depression follow-up visit, and DAP  Physical Preventive Adult Physical      Topic Date Due    Hepatitis B Vaccine (1 of 3 - 3-dose series) Never done    Pneumococcal Vaccine (1 of 2 - PCV) Never done    Flu Vaccine (1) 09/01/2023    COVID-19 Vaccine (4 - 2023-24 season) 09/01/2023       Next Steps:   Patient has upcoming appointment, these items will be addressed at that time.    Type of outreach:    Chart review performed, no outreach needed.      Questions for provider review:    None           Sharron Croft  Chart routed to self.

## 2024-02-18 SDOH — HEALTH STABILITY: PHYSICAL HEALTH: ON AVERAGE, HOW MANY MINUTES DO YOU ENGAGE IN EXERCISE AT THIS LEVEL?: 30 MIN

## 2024-02-18 SDOH — HEALTH STABILITY: PHYSICAL HEALTH: ON AVERAGE, HOW MANY DAYS PER WEEK DO YOU ENGAGE IN MODERATE TO STRENUOUS EXERCISE (LIKE A BRISK WALK)?: 2 DAYS

## 2024-02-18 ASSESSMENT — PATIENT HEALTH QUESTIONNAIRE - PHQ9
10. IF YOU CHECKED OFF ANY PROBLEMS, HOW DIFFICULT HAVE THESE PROBLEMS MADE IT FOR YOU TO DO YOUR WORK, TAKE CARE OF THINGS AT HOME, OR GET ALONG WITH OTHER PEOPLE: SOMEWHAT DIFFICULT
SUM OF ALL RESPONSES TO PHQ QUESTIONS 1-9: 8
SUM OF ALL RESPONSES TO PHQ QUESTIONS 1-9: 8

## 2024-02-18 ASSESSMENT — SOCIAL DETERMINANTS OF HEALTH (SDOH): HOW OFTEN DO YOU GET TOGETHER WITH FRIENDS OR RELATIVES?: NEVER

## 2024-02-19 ENCOUNTER — ANCILLARY PROCEDURE (OUTPATIENT)
Dept: GENERAL RADIOLOGY | Facility: CLINIC | Age: 39
End: 2024-02-19
Attending: FAMILY MEDICINE
Payer: COMMERCIAL

## 2024-02-19 ENCOUNTER — OFFICE VISIT (OUTPATIENT)
Dept: FAMILY MEDICINE | Facility: CLINIC | Age: 39
End: 2024-02-19
Payer: COMMERCIAL

## 2024-02-19 VITALS
SYSTOLIC BLOOD PRESSURE: 124 MMHG | WEIGHT: 205 LBS | OXYGEN SATURATION: 99 % | BODY MASS INDEX: 29.35 KG/M2 | DIASTOLIC BLOOD PRESSURE: 76 MMHG | HEART RATE: 78 BPM | HEIGHT: 70 IN | TEMPERATURE: 98.5 F

## 2024-02-19 DIAGNOSIS — I10 ESSENTIAL HYPERTENSION: ICD-10-CM

## 2024-02-19 DIAGNOSIS — I48.0 PAROXYSMAL ATRIAL FIBRILLATION (H): ICD-10-CM

## 2024-02-19 DIAGNOSIS — R05.2 SUBACUTE COUGH: ICD-10-CM

## 2024-02-19 DIAGNOSIS — Z00.00 ROUTINE GENERAL MEDICAL EXAMINATION AT A HEALTH CARE FACILITY: Primary | ICD-10-CM

## 2024-02-19 DIAGNOSIS — F10.10 ETOH ABUSE: ICD-10-CM

## 2024-02-19 LAB
ALBUMIN SERPL BCG-MCNC: 4.5 G/DL (ref 3.5–5.2)
ALP SERPL-CCNC: 78 U/L (ref 40–150)
ALT SERPL W P-5'-P-CCNC: 79 U/L (ref 0–70)
ANION GAP SERPL CALCULATED.3IONS-SCNC: 11 MMOL/L (ref 7–15)
AST SERPL W P-5'-P-CCNC: 66 U/L (ref 0–45)
BILIRUB SERPL-MCNC: 0.3 MG/DL
BUN SERPL-MCNC: 11.8 MG/DL (ref 6–20)
CALCIUM SERPL-MCNC: 8.9 MG/DL (ref 8.6–10)
CHLORIDE SERPL-SCNC: 106 MMOL/L (ref 98–107)
CREAT SERPL-MCNC: 0.97 MG/DL (ref 0.67–1.17)
DEPRECATED HCO3 PLAS-SCNC: 25 MMOL/L (ref 22–29)
EGFRCR SERPLBLD CKD-EPI 2021: >90 ML/MIN/1.73M2
ERYTHROCYTE [DISTWIDTH] IN BLOOD BY AUTOMATED COUNT: 13.2 % (ref 10–15)
GLUCOSE SERPL-MCNC: 85 MG/DL (ref 70–99)
HCT VFR BLD AUTO: 42.1 % (ref 40–53)
HGB BLD-MCNC: 14.2 G/DL (ref 13.3–17.7)
MCH RBC QN AUTO: 30.8 PG (ref 26.5–33)
MCHC RBC AUTO-ENTMCNC: 33.7 G/DL (ref 31.5–36.5)
MCV RBC AUTO: 91 FL (ref 78–100)
PLATELET # BLD AUTO: 230 10E3/UL (ref 150–450)
POTASSIUM SERPL-SCNC: 4.7 MMOL/L (ref 3.4–5.3)
PROT SERPL-MCNC: 7.2 G/DL (ref 6.4–8.3)
RBC # BLD AUTO: 4.61 10E6/UL (ref 4.4–5.9)
SODIUM SERPL-SCNC: 142 MMOL/L (ref 135–145)
WBC # BLD AUTO: 4.4 10E3/UL (ref 4–11)

## 2024-02-19 PROCEDURE — 80053 COMPREHEN METABOLIC PANEL: CPT | Performed by: FAMILY MEDICINE

## 2024-02-19 PROCEDURE — 71046 X-RAY EXAM CHEST 2 VIEWS: CPT | Mod: TC | Performed by: RADIOLOGY

## 2024-02-19 PROCEDURE — 36415 COLL VENOUS BLD VENIPUNCTURE: CPT | Performed by: FAMILY MEDICINE

## 2024-02-19 PROCEDURE — 99395 PREV VISIT EST AGE 18-39: CPT | Performed by: FAMILY MEDICINE

## 2024-02-19 PROCEDURE — 85027 COMPLETE CBC AUTOMATED: CPT | Performed by: FAMILY MEDICINE

## 2024-02-19 RX ORDER — METOPROLOL SUCCINATE 50 MG/1
50 TABLET, EXTENDED RELEASE ORAL DAILY
Qty: 90 TABLET | Refills: 1 | Status: SHIPPED | OUTPATIENT
Start: 2024-02-19 | End: 2024-08-16

## 2024-02-19 NOTE — PATIENT INSTRUCTIONS
Preventive Care Advice   This is general advice given by our system to help you stay healthy. However, your care team may have specific advice just for you. Please talk to your care team about your preventive care needs.  Nutrition  Eat 5 or more servings of fruits and vegetables each day.  Try wheat bread, brown rice and whole grain pasta (instead of white bread, rice, and pasta).  Get enough calcium and vitamin D. Check the label on foods and aim for 100% of the RDA (recommended daily allowance).  Lifestyle  Exercise at least 150 minutes each week  (30 minutes a day, 5 days a week).  Do muscle strengthening activities 2 days a week. These help control your weight and prevent disease.  No smoking.  Wear sunscreen to prevent skin cancer.  Have a dental exam and cleaning every 6 months.  Yearly exams  See your health care team every year to talk about:  Any changes in your health.  Any medicines your care team has prescribed.  Preventive care, family planning, and ways to prevent chronic diseases.  Shots (vaccines)   HPV shots (up to age 26), if you've never had them before.  Hepatitis B shots (up to age 59), if you've never had them before.  COVID-19 shot: Get this shot when it's due.  Flu shot: Get a flu shot every year.  Tetanus shot: Get a tetanus shot every 10 years.  Pneumococcal, hepatitis A, and RSV shots: Ask your care team if you need these based on your risk.  Shingles shot (for age 50 and up)  General health tests  Diabetes screening:  Starting at age 35, Get screened for diabetes at least every 3 years.  If you are younger than age 35, ask your care team if you should be screened for diabetes.  Cholesterol test: At age 39, start having a cholesterol test every 5 years, or more often if advised.  Bone density scan (DEXA): At age 50, ask your care team if you should have this scan for osteoporosis (brittle bones).  Hepatitis C: Get tested at least once in your life.  STIs (sexually transmitted  infections)  Before age 24: Ask your care team if you should be screened for STIs.  After age 24: Get screened for STIs if you're at risk. You are at risk for STIs (including HIV) if:  You are sexually active with more than one person.  You don't use condoms every time.  You or a partner was diagnosed with a sexually transmitted infection.  If you are at risk for HIV, ask about PrEP medicine to prevent HIV.  Get tested for HIV at least once in your life, whether you are at risk for HIV or not.  Cancer screening tests  Cervical cancer screening: If you have a cervix, begin getting regular cervical cancer screening tests starting at age 21.  Breast cancer scan (mammogram): If you've ever had breasts, begin having regular mammograms starting at age 40. This is a scan to check for breast cancer.  Colon cancer screening: It is important to start screening for colon cancer at age 45.  Have a colonoscopy test every 10 years (or more often if you're at risk) Or, ask your provider about stool tests like a FIT test every year or Cologuard test every 3 years.  To learn more about your testing options, visit:   https://www.[employer]/902251.pdf.  For help making a decision, visit:   https://bit.ly/cu16649.  Prostate cancer screening test: If you have a prostate, ask your care team if a prostate cancer screening test (PSA) at age 55 is right for you.  Lung cancer screening: If you are a current or former smoker ages 50 to 80, ask your care team if ongoing lung cancer screenings are right for you.  For informational purposes only. Not to replace the advice of your health care provider. Copyright   2023 [employer]. All rights reserved. Clinically reviewed by the [facility] Transitions Program. [employer] 738333 - REV 01/24.    Relationships for Good Health  Relationships are important for our health and happiness. Social isolation, loneliness and lack of support are bad for your health. Studies show that  loneliness can harm health and limit your life span as much as high blood pressure and smoking.   Take some time to reflect on your relationships. Then answer these questions:  Are there people in your life that cause you stress or drain your energy? What can you do to set limits?  ________________________________________________________________________________________________________________________________________________________________________________________________________________________________________________________________________________________________________________________________________________  Who do you enjoy spending time with? Who can you go to for support?  ________________________________________________________________________________________________________________________________________________________________________________________________________________________________________________________________________________________________________________________________________________  What can you do to improve your relationships with others?  __________________________________________________________________________________________________________________________________________________________________________________________________________________  ______________________________________________________________________________________________________________________________  What do you like most about your relationships with others?  ________________________________________________________________________________________________________________________________________________________________________________________________________________________________________________________________________________________________________________________________________________  My goal: ______________________________________________________________________  I will  ______________________________________________________________________________________________________________________________________________________________________________________________    For informational purposes only. Not to replace the advice of your health care provider. Copyright   2018 [facility]. All rights reserved. Clinically reviewed by [facility]  Team. [employer] 968859 - Rev 04/21.    Learning About Stress  What is stress?     Stress is your body's response to a hard situation. Your body can have a physical, emotional, or mental response. Stress is a fact of life for most people, and it affects everyone differently. What causes stress for you may not be stressful for someone else.  A lot of things can cause stress. You may feel stress when you go on a job interview, take a test, or run a race. This kind of short-term stress is normal and even useful. It can help you if you need to work hard or react quickly. For example, stress can help you finish an important job on time.  Long-term stress is caused by ongoing stressful situations or events. Examples of long-term stress include long-term health problems, ongoing problems at work, or conflicts in your family. Long-term stress can harm your health.  How does stress affect your health?  When you are stressed, your body responds as though you are in danger. It makes hormones that speed up your heart, make you breathe faster, and give you a burst of energy. This is called the fight-or-flight stress response. If the stress is over quickly, your body goes back to normal and no harm is done.  But if stress happens too often or lasts too long, it can have bad effects. Long-term stress can make you more likely to get sick, and it can make symptoms of some diseases worse. If you tense up when you are stressed, you may develop neck, shoulder, or low back pain. Stress is linked to high blood pressure and heart disease.  Stress also  harms your emotional health. It can make you duff, tense, or depressed. Your relationships may suffer, and you may not do well at work or school.  What can you do to manage stress?  You can try these things to help manage stress:   Do something active. Exercise or activity can help reduce stress. Walking is a great way to get started. Even everyday activities such as housecleaning or yard work can help.  Try yoga or genaro chi. These techniques combine exercise and meditation. You may need some training at first to learn them.  Do something you enjoy. For example, listen to music or go to a movie. Practice your hobby or do volunteer work.  Meditate. This can help you relax, because you are not worrying about what happened before or what may happen in the future.  Do guided imagery. Imagine yourself in any setting that helps you feel calm. You can use online videos, books, or a teacher to guide you.  Do breathing exercises. For example:  From a standing position, bend forward from the waist with your knees slightly bent. Let your arms dangle close to the floor.  Breathe in slowly and deeply as you return to a standing position. Roll up slowly and lift your head last.  Hold your breath for just a few seconds in the standing position.  Breathe out slowly and bend forward from the waist.  Let your feelings out. Talk, laugh, cry, and express anger when you need to. Talking with supportive friends or family, a counselor, or a john leader about your feelings is a healthy way to relieve stress. Avoid discussing your feelings with people who make you feel worse.  Write. It may help to write about things that are bothering you. This helps you find out how much stress you feel and what is causing it. When you know this, you can find better ways to cope.  What can you do to prevent stress?  You might try some of these things to help prevent stress:  Manage your time. This helps you find time to do the things you want and need to  "do.  Get enough sleep. Your body recovers from the stresses of the day while you are sleeping.  Get support. Your family, friends, and community can make a difference in how you experience stress.  Limit your news feed. Avoid or limit time on social media or news that may make you feel stressed.  Do something active. Exercise or activity can help reduce stress. Walking is a great way to get started.  Where can you learn more?  Go to https://www.[employer].net/patiented  Enter N032 in the search box to learn more about \"Learning About Stress.\"  Current as of: February 26, 2023               Content Version: 13.8    5918-6751 [email or web address].   Care instructions adapted under license by your healthcare professional. If you have questions about a medical condition or this instruction, always ask your healthcare professional. [employer] disclaims any warranty or liability for your use of this information.      Learning About Depression Screening  What is depression screening?  Depression screening is a way to see if you have depression symptoms. It may be done by a doctor or counselor. It's often part of a routine checkup. That's because your mental health is just as important as your physical health.  Depression is a mental health condition that affects how you feel, think, and act. You may:  Have less energy.  Lose interest in your daily activities.  Feel sad and grouchy for a long time.  Depression is very common. It affects people of all ages.  Many things can lead to depression. Some people become depressed after they have a stroke or find out they have a major illness like cancer or heart disease. The death of a loved one or a breakup may lead to depression. It can run in families. Most experts believe that a combination of inherited genes and stressful life events can cause it.  What happens during screening?  You may be asked to fill out a form about your depression symptoms. You and the " "doctor will discuss your answers. The doctor may ask you more questions to learn more about how you think, act, and feel.  What happens after screening?  If you have symptoms of depression, your doctor will talk to you about your options.  Doctors usually treat depression with medicines or counseling. Often, combining the two works best. Many people don't get help because they think that they'll get over the depression on their own. But people with depression may not get better unless they get treatment.  The cause of depression is not well understood. There may be many factors involved. But if you have depression, it's not your fault.  A serious symptom of depression is thinking about death or suicide. If you or someone you care about talks about this or about feeling hopeless, get help right away.  It's important to know that depression can be treated. Medicine, counseling, and self-care may help.  Where can you learn more?  Go to https://www.[employer].net/patiented  Enter T185 in the search box to learn more about \"Learning About Depression Screening.\"  Current as of: June 25, 2023               Content Version: 13.8    5135-2086 [employer].   Care instructions adapted under license by your healthcare professional. If you have questions about a medical condition or this instruction, always ask your healthcare professional. [employer] disclaims any warranty or liability for your use of this information.      "

## 2024-02-19 NOTE — PROGRESS NOTES
"Preventive Care Visit  [facility] AN Soni MD, Family Practice  Feb 19, 2024    Assessment & Plan     Routine general medical examination at a health care facility      Paroxysmal atrial fibrillation (H)  Has good rate control with diltiazem, but with tremor and mirlande issues may benefit more from betablocker.  Will stop the diltiazem start metoprolol, and recheck   - Adult Cardiology Eval  Referral; Future  - Comprehensive metabolic panel (BMP + Alb, Alk Phos, ALT, AST, Total. Bili, TP); Future  - metoprolol succinate ER (TOPROL XL) 50 MG 24 hr tablet; Take 1 tablet (50 mg) by mouth daily  - CBC with platelets; Future    Essential hypertension  Good control.  Continue currant medications, continue to monitor.    - Comprehensive metabolic panel (BMP + Alb, Alk Phos, ALT, AST, Total. Bili, TP); Future    ETOH abuse  Okay 90 days sober.  Has sponsor,  going to meetings     Subacute cough  Likely viral.   - XR Chest 2 Views; Futur      BMI  Estimated body mass index is 29.41 kg/m  as calculated from the following:    Height as of this encounter: 1.778 m (5' 10\").    Weight as of this encounter: 93 kg (205 lb).   Weight management plan: Discussed healthy diet and exercise guidelines    Counseling  Appropriate preventive services were discussed with this patient, including applicable screening as appropriate for fall prevention, nutrition, physical activity, Tobacco-use cessation, weight loss and cognition.  Checklist reviewing preventive services available has been given to the patient.  Reviewed patient's diet, addressing concerns and/or questions.   He is at risk for lack of exercise and has been provided with information to increase physical activity for the benefit of his well-being.   Patient is at risk for social isolation and has been provided with information about the benefit of social connection.   The patient was instructed to see the dentist every 6 months.   The patient's " PHQ-9 score is consistent with mild depression. He was provided with information regarding depression.     Patient has been advised of split billing requirements and indicates understanding: Yes      Subjective   Zachery is a 38 year old, presenting for the following:  Physical        2/19/2024    10:00 AM   Additional Questions   Roomed by CANDACE Rizvi        Health Care Directive  Patient does not have a Health Care Directive or Living Will: Discussed advance care planning with patient; however, patient declined at this time.    HPI    -He has been having a lot of fluid coming from his lungs with coughing. He was coughing so hard that he did throw up 3-4 times.   Has improved past week    -He would like to get blood work done to check his kidneys and liver.   Has been sober for 90 days    -He would like his heart checked. Maybe getting a referral to a cardiologist for a-fib.     -He has been having a lot of memory loss.    Under a lot of stress            2/18/2024   General Health   How would you rate your overall physical health? (!) FAIR   Feel stress (tense, anxious, or unable to sleep) To some extent   (!) STRESS CONCERN      2/18/2024   Nutrition   Three or more servings of calcium each day? (!) NO   Diet: Regular (no restrictions)   How many servings of fruit and vegetables per day? (!) 0-1   How many sweetened beverages each day? 0-1         2/18/2024   Exercise   Days per week of moderate/strenous exercise 2 days   Average minutes spent exercising at this level 30 min   (!) EXERCISE CONCERN      2/18/2024   Social Factors   Frequency of gathering with friends or relatives Never   Worry food won't last until get money to buy more No   Food not last or not have enough money for food? No   Do you have housing?  Yes   Are you worried about losing your housing? No   Lack of transportation? No   Unable to get utilities (heat,electricity)? No   (!) SOCIAL CONNECTIONS CONCERN      2/18/2024   Dental   Dentist two times  "every year? (!) NO         2/18/2024   TB Screening   Were you born outside of [place]?  No       Today's PHQ-9 Score:       2/18/2024    10:40 AM   PHQ-9 SCORE   PHQ-9 Total Score MyChart 8 (Mild depression)   PHQ-9 Total Score 8         2/18/2024   Substance Use   Alcohol more than 3/day or more than 7/wk No   Do you use any other substances recreationally? No     Social History     Tobacco Use    Smoking status: Never    Smokeless tobacco: Never   Substance Use Topics    Alcohol use: Not Currently     Comment: Socially    Drug use: No             2/18/2024   One time HIV Screening   Previous HIV test? No         2/18/2024   STI Screening   New sexual partner(s) since last STI/HIV test? No        Reviewed and updated as needed this visit by Provider                      Review of Systems  Constitutional, HEENT, cardiovascular, pulmonary, gi and gu systems are negative, except as otherwise noted.     Objective    Exam  /76   Pulse 78   Temp 98.5  F (36.9  C) (Tympanic)   Ht 1.778 m (5' 10\")   Wt 93 kg (205 lb)   SpO2 99%   BMI 29.41 kg/m     Estimated body mass index is 29.41 kg/m  as calculated from the following:    Height as of this encounter: 1.778 m (5' 10\").    Weight as of this encounter: 93 kg (205 lb).    Physical Exam  GENERAL: alert, anxious and no distress  NECK: no adenopathy, no asymmetry, masses, or scars  RESP: lungs clear to auscultation - no rales, rhonchi or wheezes  CV: regular rate and rhythm, normal S1 S2, no S3 or S4, no murmur, click or rub, no peripheral edema  ABDOMEN: soft, nontender, no hepatosplenomegaly, no masses and bowel sounds normal  MS: no gross musculoskeletal defects noted, no edema  Neuro  slight intention rapid tremor    Signed Electronically by: Giovanni Soni MD    Answers submitted by the patient for this visit:  Patient Health Questionnaire (Submitted on 2/18/2024)  If you checked off any problems, how difficult have these problems made it for you to do your work, " take care of things at home, or get along with other people?: Somewhat difficult  PHQ9 TOTAL SCORE: 8

## 2024-03-01 ASSESSMENT — ANXIETY QUESTIONNAIRES
1. FEELING NERVOUS, ANXIOUS, OR ON EDGE: NOT AT ALL
7. FEELING AFRAID AS IF SOMETHING AWFUL MIGHT HAPPEN: SEVERAL DAYS
GAD7 TOTAL SCORE: 1
GAD7 TOTAL SCORE: 1
6. BECOMING EASILY ANNOYED OR IRRITABLE: NOT AT ALL
4. TROUBLE RELAXING: NOT AT ALL
5. BEING SO RESTLESS THAT IT IS HARD TO SIT STILL: NOT AT ALL
2. NOT BEING ABLE TO STOP OR CONTROL WORRYING: NOT AT ALL
IF YOU CHECKED OFF ANY PROBLEMS ON THIS QUESTIONNAIRE, HOW DIFFICULT HAVE THESE PROBLEMS MADE IT FOR YOU TO DO YOUR WORK, TAKE CARE OF THINGS AT HOME, OR GET ALONG WITH OTHER PEOPLE: NOT DIFFICULT AT ALL
8. IF YOU CHECKED OFF ANY PROBLEMS, HOW DIFFICULT HAVE THESE MADE IT FOR YOU TO DO YOUR WORK, TAKE CARE OF THINGS AT HOME, OR GET ALONG WITH OTHER PEOPLE?: NOT DIFFICULT AT ALL
7. FEELING AFRAID AS IF SOMETHING AWFUL MIGHT HAPPEN: SEVERAL DAYS
3. WORRYING TOO MUCH ABOUT DIFFERENT THINGS: NOT AT ALL

## 2024-03-04 ENCOUNTER — VIRTUAL VISIT (OUTPATIENT)
Dept: FAMILY MEDICINE | Facility: CLINIC | Age: 39
End: 2024-03-04
Payer: COMMERCIAL

## 2024-03-04 DIAGNOSIS — F33.9 EPISODE OF RECURRENT MAJOR DEPRESSIVE DISORDER, UNSPECIFIED DEPRESSION EPISODE SEVERITY (H): ICD-10-CM

## 2024-03-04 PROCEDURE — 99213 OFFICE O/P EST LOW 20 MIN: CPT | Mod: 95 | Performed by: FAMILY MEDICINE

## 2024-03-04 RX ORDER — ESCITALOPRAM OXALATE 10 MG/1
10 TABLET ORAL DAILY
Qty: 90 TABLET | Refills: 3 | Status: SHIPPED | OUTPATIENT
Start: 2024-03-04

## 2024-03-04 NOTE — PROGRESS NOTES
Zachery is a 38 year old who is being evaluated via a billable video visit.      How would you like to obtain your AVS? MyChart  If the video visit is dropped, the invitation should be resent by: Text to cell phone: [phone]  Will anyone else be joining your video visit? No          Assessment & Plan     Episode of recurrent major depressive disorder, unspecified depression episode severity (H24)  Good control.  Continue currant medications, continue to monitor.    - escitalopram (LEXAPRO) 10 MG tablet; Take 1 tablet (10 mg) by mouth daily      Subjective   Zachery is a 38 year old, presenting for the following health issues:  Recheck Medication        3/4/2024     7:03 AM   Additional Questions   Roomed by Sonja BROWN CMA     History of Present Illness       Mental Health Follow-up:  Patient presents to follow-up on Depression & Anxiety.Patient's depression since last visit has been:  Better  The patient is not having other symptoms associated with depression.  Patient's anxiety since last visit has been:  Better  The patient is not having other symptoms associated with anxiety.  Any significant life events: relationship concerns and job concerns  Patient is not feeling anxious or having panic attacks.  Patient has no concerns about alcohol or drug use.    Vascular Disease:  He presents for follow up of vascular disease.     He never takes nitroglycerin. He takes daily aspirin.    Reason for visit:  Appointment follow-up    He eats 2-3 servings of fruits and vegetables daily.He consumes 0 sweetened beverage(s) daily.He exercises with enough effort to increase his heart rate 30 to 60 minutes per day.  He exercises with enough effort to increase his heart rate 5 days per week.   He is taking medications regularly.             Review of Systems  Constitutional, HEENT, cardiovascular, pulmonary, gi and gu systems are negative, except as otherwise noted.      Objective           Vitals:  No vitals were obtained today due  to virtual visit.    Physical Exam   GENERAL: alert and no distress  EYES: Eyes grossly normal to inspection.  No discharge or erythema, or obvious scleral/conjunctival abnormalities.  RESP: No audible wheeze, cough, or visible cyanosis.    SKIN: Visible skin clear. No significant rash, abnormal pigmentation or lesions.  NEURO: Cranial nerves grossly intact.  Mentation and speech appropriate for age.  PSYCH: Appropriate affect, tone, and pace of words        Video-Visit Details    Type of service:  Video Visit     Originating Location (pt. Location): Home    Distant Location (provider location):  On-site  Platform used for Video Visit: Shane  Signed Electronically by: Giovanni Soni MD

## 2024-03-08 ENCOUNTER — OFFICE VISIT (OUTPATIENT)
Dept: CARDIOLOGY | Facility: CLINIC | Age: 39
End: 2024-03-08
Attending: FAMILY MEDICINE
Payer: COMMERCIAL

## 2024-03-08 VITALS
WEIGHT: 205 LBS | RESPIRATION RATE: 14 BRPM | DIASTOLIC BLOOD PRESSURE: 88 MMHG | OXYGEN SATURATION: 100 % | BODY MASS INDEX: 29.41 KG/M2 | SYSTOLIC BLOOD PRESSURE: 131 MMHG | HEART RATE: 60 BPM

## 2024-03-08 DIAGNOSIS — I48.0 PAROXYSMAL ATRIAL FIBRILLATION (H): Primary | ICD-10-CM

## 2024-03-08 PROCEDURE — 99204 OFFICE O/P NEW MOD 45 MIN: CPT | Performed by: INTERNAL MEDICINE

## 2024-03-08 PROCEDURE — 93000 ELECTROCARDIOGRAM COMPLETE: CPT | Performed by: INTERNAL MEDICINE

## 2024-03-08 NOTE — LETTER
3/8/2024    Giovanni Soni MD  [address] [facility] [address]    RE: Colt OCAMPO Raji       Dear Colleague,     I had the pleasure of seeing Colt Alegria in the [facility].  CARDIOLOGY CLINIC CONSULTATION    PRIMARY CARE PHYSICIAN:  Giovanni Soni    Tests reviewed/interpreted independently in clinic today:   EKG: Sinus rhythm  Echocardiogram: Normal BiV size and function, no valve disease  Blood work: CBC, BMP  Thank you ma'am stress test in 2016-normal     The level of medical decision making during this visit was of moderate complexity.     HISTORY OF PRESENT ILLNESS:  Today, I had the pleasure of connecting with Colt Alegria.  He is a very pleasant 38-year-old gentleman who presents to the clinic in initial consultation.  He was diagnosed with atrial fibrillation in 2017.  He was addicted to alcohol at that time.   He has been sober for the past many years and has not had any recurrence of atrial fibrillation.  He was recently switched from diltiazem to metoprolol and has been tolerating it well.  Does not report any symptoms today.  No chest pain, shortness of breath orthopnea, PND, lower extremity edema, presyncope or syncope.     ASSESSMENT: Pertinent issues addressed/ reviewed during this cardiology visit    Paroxysmal, symptomatic atrial fibrillation in the setting of alcohol abuse-no recurrence.  On metoprolol but appropriately not on anticoagulation.  We also discussed other risk factors for atrial fibrillation such as family history, sleep apnea, obesity etc.  He does not have most of these risks.  On transthoracic echocardiogram.  The results if they are abnormal.  If everything is normal we are going to see him on a yearly basis.  Since he becomes symptomatic when in atrial fibrillation and has not noticed these symptoms since stopping ETOH I do not believe we need to do a rhythm monitor to document  that he is A-fib free.     Essential hypertension??-blood pressure  in clinic is normal.    History of alcohol abuse-recovering.    Orders Placed This Encounter   Procedures    EKG 12-lead complete w/read - Clinics (performed today)    Echocardiogram Complete       PAST MEDICAL HISTORY:  Past Medical History:   Diagnosis Date    Hypertension     Paroxysmal atrial fibrillation (H) 12/27/2016       MEDICATIONS:  Current Outpatient Medications   Medication    escitalopram (LEXAPRO) 10 MG tablet    flecainide (TAMBOCOR) 150 MG tablet    metoprolol succinate ER (TOPROL XL) 50 MG 24 hr tablet     No current facility-administered medications for this visit.       ALLERGIES:  No Known Allergies    SOCIAL HISTORY:  I have reviewed this patient's social history and updated it with pertinent information if needed. Colt Alegria  reports that he has never smoked. He has never used smokeless tobacco. He reports that he does not currently use alcohol. He reports that he does not use drugs.    FAMILY HISTORY:  I have reviewed this patient's family history and updated it with pertinent information if needed.   Family History   Problem Relation Age of Onset    Heart Disease Father     Heart Disease Maternal Grandfather     Hypertension Sister        REVIEW OF SYSTEMS:  Skin:        Eyes:       ENT:       Respiratory:  Negative shortness of breath;dyspnea on exertion;dyspnea at rest  Cardiovascular:  Negative;edema;syncope or near-syncope Positive for;chest pain;palpitations;dizziness;lightheadedness;fatigue  Gastroenterology:      Genitourinary:       Musculoskeletal:       Neurologic:       Psychiatric:       Heme/Lymph/Imm:       Endocrine:           PHYSICAL EXAM:      BP: 131/88 Pulse: 60   Resp: 14 SpO2: 100 %      Vital Signs with Ranges  Pulse:  [60] 60  Resp:  [14] 14  BP: (131)/(88) 131/88  SpO2:  [100 %] 100 %  205 lbs 0 oz    Constitutional: alert, no distress  Respiratory: Good bilateral air entry  Cardiovascular: s1 s2 normal, no murmurs  GI: nondistended  Neuropsychiatric:  appropriate affact    It was a pleasure seeing this patient in clinic today. Please do not hesitate to contact me with any future questions.     Farhan ZHANG, FACC, FASE  Cardiology - [facility]  March 9, 2024    This note was completed in part using dictation via the Dragon voice recognition software. Some word and grammatical errors may occur and must be interpreted in the appropriate clinical context.  If there are any questions pertaining to this issue, please contact me for further clarification.    Thank you for allowing me to participate in the care of your patient.      Sincerely,     Farhan Barlow MD     [facility]  cc:   Giovanni Soni MD  [id number] [id number]

## 2024-03-09 NOTE — PROGRESS NOTES
CARDIOLOGY CLINIC CONSULTATION    PRIMARY CARE PHYSICIAN:  Giovanni Soni    Tests reviewed/interpreted independently in clinic today:   EKG: Sinus rhythm  Echocardiogram: Normal BiV size and function, no valve disease  Blood work: CBC, BMP  Thank you ma'am stress test in 2016-normal     The level of medical decision making during this visit was of moderate complexity.     HISTORY OF PRESENT ILLNESS:  Today, I had the pleasure of connecting with Colt Alegria.  He is a very pleasant 38-year-old gentleman who presents to the clinic in initial consultation.  He was diagnosed with atrial fibrillation in 2017.  He was addicted to alcohol at that time.   He has been sober for the past many years and has not had any recurrence of atrial fibrillation.  He was recently switched from diltiazem to metoprolol and has been tolerating it well.  Does not report any symptoms today.  No chest pain, shortness of breath orthopnea, PND, lower extremity edema, presyncope or syncope.     ASSESSMENT: Pertinent issues addressed/ reviewed during this cardiology visit    Paroxysmal, symptomatic atrial fibrillation in the setting of alcohol abuse-no recurrence.  On metoprolol but appropriately not on anticoagulation.  We also discussed other risk factors for atrial fibrillation such as family history, sleep apnea, obesity etc.  He does not have most of these risks.  On transthoracic echocardiogram.  The results if they are abnormal.  If everything is normal we are going to see him on a yearly basis.  Since he becomes symptomatic when in atrial fibrillation and has not noticed these symptoms since stopping ETOH I do not believe we need to do a rhythm monitor to document  that he is A-fib free.     Essential hypertension??-blood pressure in clinic is normal.    History of alcohol abuse-recovering.    Orders Placed This Encounter   Procedures    EKG 12-lead complete w/read - Clinics (performed today)    Echocardiogram Complete       PAST  MEDICAL HISTORY:  Past Medical History:   Diagnosis Date    Hypertension     Paroxysmal atrial fibrillation (H) 12/27/2016       MEDICATIONS:  Current Outpatient Medications   Medication    escitalopram (LEXAPRO) 10 MG tablet    flecainide (TAMBOCOR) 150 MG tablet    metoprolol succinate ER (TOPROL XL) 50 MG 24 hr tablet     No current facility-administered medications for this visit.       ALLERGIES:  No Known Allergies    SOCIAL HISTORY:  I have reviewed this patient's social history and updated it with pertinent information if needed. Colt Alegria  reports that he has never smoked. He has never used smokeless tobacco. He reports that he does not currently use alcohol. He reports that he does not use drugs.    FAMILY HISTORY:  I have reviewed this patient's family history and updated it with pertinent information if needed.   Family History   Problem Relation Age of Onset    Heart Disease Father     Heart Disease Maternal Grandfather     Hypertension Sister        REVIEW OF SYSTEMS:  Skin:        Eyes:       ENT:       Respiratory:  Negative shortness of breath;dyspnea on exertion;dyspnea at rest  Cardiovascular:  Negative;edema;syncope or near-syncope Positive for;chest pain;palpitations;dizziness;lightheadedness;fatigue  Gastroenterology:      Genitourinary:       Musculoskeletal:       Neurologic:       Psychiatric:       Heme/Lymph/Imm:       Endocrine:           PHYSICAL EXAM:      BP: 131/88 Pulse: 60   Resp: 14 SpO2: 100 %      Vital Signs with Ranges  Pulse:  [60] 60  Resp:  [14] 14  BP: (131)/(88) 131/88  SpO2:  [100 %] 100 %  205 lbs 0 oz    Constitutional: alert, no distress  Respiratory: Good bilateral air entry  Cardiovascular: s1 s2 normal, no murmurs  GI: nondistended  Neuropsychiatric: appropriate affact    It was a pleasure seeing this patient in clinic today. Please do not hesitate to contact me with any future questions.     Farhan ZHANG, FACC, SHEILA  Cardiology - [facility]  March 9,  2024    This note was completed in part using dictation via the Dragon voice recognition software. Some word and grammatical errors may occur and must be interpreted in the appropriate clinical context.  If there are any questions pertaining to this issue, please contact me for further clarification.

## 2024-05-06 ENCOUNTER — MYC MEDICAL ADVICE (OUTPATIENT)
Dept: FAMILY MEDICINE | Facility: CLINIC | Age: 39
End: 2024-05-06
Payer: COMMERCIAL

## 2024-05-06 DIAGNOSIS — R79.89 ELEVATED LFTS: Primary | ICD-10-CM

## 2024-05-08 NOTE — TELEPHONE ENCOUNTER
Lets have those liver function test recheck.  It will be 3 months next week.  I placed an order for that.  Weight training, can help your body use the testosterone you have more efficiently.  Also good nutrition, eatting your veggies,   limiting carbs, fatty foods, and alcohol will help.    Giovanni

## 2024-05-13 ENCOUNTER — VIRTUAL VISIT (OUTPATIENT)
Dept: FAMILY MEDICINE | Facility: CLINIC | Age: 39
End: 2024-05-13
Payer: COMMERCIAL

## 2024-05-13 DIAGNOSIS — R53.83 FATIGUE, UNSPECIFIED TYPE: Primary | ICD-10-CM

## 2024-05-13 PROCEDURE — 99213 OFFICE O/P EST LOW 20 MIN: CPT | Mod: 95 | Performed by: FAMILY MEDICINE

## 2024-05-13 NOTE — LETTER
My Depression Action Plan  Name: Colt Alegria   Date of Birth 1985  Date: 5/13/2024    My doctor: Giovanni Soni   My clinic: [facility]  [address] [facility] [id number] [phone]            GREEN    ZONE   Good Control    What it looks like:   Things are going generally well. You have normal ups and downs. You may even feel depressed from time to time, but bad moods usually last less than a day.   What you need to do:  Continue to care for yourself (see self care plan)  Check your depression survival kit and update it as needed  Follow your physician s recommendations including any medication.  Do not stop taking medication unless you consult with your physician first.             YELLOW         ZONE Getting Worse    What it looks like:   Depression is starting to interfere with your life.   It may be hard to get out of bed; you may be starting to isolate yourself from others.  Symptoms of depression are starting to last most all day and this has happened for several days.   You may have suicidal thoughts but they are not constant.   What you need to do:     Call your care team. Your response to treatment will improve if you keep your care team informed of your progress. Yellow periods are signs an adjustment may need to be made.     Continue your self-care.  Just get dressed and ready for the day.  Don't give yourself time to talk yourself out of it.    Talk to someone in your support network.    Open up your Depression Self-Care Plan/Wellness Kit.             RED    ZONE Medical Alert - Get Help    What it looks like:   Depression is seriously interfering with your life.   You may experience these or other symptoms: You can t get out of bed most days, can t work or engage in other necessary activities, you have trouble taking care of basic hygiene, or basic responsibilities, thoughts of suicide or death that will not go away, self-injurious behavior.      What you need to do:  Call your care team and request a same-day appointment. If they are not available (weekends or after hours) call your local crisis line, emergency room or 911.          Depression Self-Care Plan / Wellness Kit    Many people find that medication and therapy are helpful treatments for managing depression. In addition, making small changes to your everyday life can help to boost your mood and improve your wellbeing. Below are some tips for you to consider. Be sure to talk with your medical provider and/or behavioral health consultant if your symptoms are worsening or not improving.     Sleep   Sleep hygiene  means all of the habits that support good, restful sleep. It includes maintaining a consistent bedtime and wake time, using your bedroom only for sleeping or sex, and keeping the bedroom dark and free of distractions like a computer, smartphone, or television.     Develop a Healthy Routine  Maintain good hygiene. Get out of bed in the morning, make your bed, brush your teeth, take a shower, and get dressed. Don t spend too much time viewing media that makes you feel stressed. Find time to relax each day.    Exercise  Get some form of exercise every day. This will help reduce pain and release endorphins, the  feel good  chemicals in your brain. It can be as simple as just going for a walk or doing some gardening, anything that will get you moving.      Diet  Strive to eat healthy foods, including fruits and vegetables. Drink plenty of water. Avoid excessive sugar, caffeine, alcohol, and other mood-altering substances.     Stay Connected with Others  Stay in touch with friends and family members.    Manage Your Mood  Try deep breathing, massage therapy, biofeedback, or meditation. Take part in fun activities when you can. Try to find something to smile about each day.     Psychotherapy  Be open to working with a therapist if your provider recommends it.     Medication  Be sure to take your  medication as prescribed. Most anti-depressants need to be taken every day. It usually takes several weeks for medications to work. Not all medicines work for all people. It is important to follow-up with your provider to make sure you have a treatment plan that is working for you. Do not stop your medication abruptly without first discussing it with your provider.    Crisis Resources   These hotlines are for both adults and children. They and are open 24 hours a day, 7 days a week unless noted otherwise.    National Suicide Prevention Lifeline   988 or 6-743-978-ZJLB (5305)    Crisis Text Line    www.crisistextline.org  Text HOME to 004306 from anywhere in the [place], anytime, about any type of crisis. A live, trained crisis counselor will receive the text and respond quickly.    [place] Lifeline for LGBTQ Youth  A national crisis intervention and suicide lifeline for LGBTQ youth under 25. Provides a safe place to talk without judgement. Call 1-362.786.4172; text START to 746690 or visit www.theBbready.comvorproject.org to talk to a trained counselor.    For On license of [facility] crisis numbers, visit the [facility] website at:  https://[place].gov/dhs/people-we-serve/adults/health-care/mental-health/resources/crisis-contacts.jsp

## 2024-05-13 NOTE — PROGRESS NOTES
Zachery is a 38 year old who is being evaluated via a billable video visit.    How would you like to obtain your AVS? MyChart  If the video visit is dropped, the invitation should be resent by: Text to cell phone: [phone]  Will anyone else be joining your video visit? No      Assessment & Plan     Fatigue, unspecified type  It seams loike this started when he started metoprolol.  If echo is normal and other labs albertina;left consider decreaseing dose to 25 mg a day.  - Testosterone, total; Future  - TSH with free T4 reflex; Future  - Comprehensive metabolic panel (BMP + Alb, Alk Phos, ALT, AST, Total. Bili, TP); Future      Subjective   Zachery is a 38 year old, presenting for the following health issues:  Medication Request (Questions about testosterone levels and possibly getting on some supplements.)      5/13/2024    11:38 AM   Additional Questions   Roomed by MOODY Paige       -Has question about testosterone levels and is wanting them checked possibly. He did have them checked in 2021.    -Also wanting to discuss getting on a supplement.        Review of Systems  Constitutional, HEENT, cardiovascular, pulmonary, gi and gu systems are negative, except as otherwise noted.      Objective           Vitals:  No vitals were obtained today due to virtual visit.    Physical Exam   GENERAL: alert and no distress  EYES: Eyes grossly normal to inspection.  No discharge or erythema, or obvious scleral/conjunctival abnormalities.  RESP: No audible wheeze, cough, or visible cyanosis.    SKIN: Visible skin clear. No significant rash, abnormal pigmentation or lesions.  NEURO: Cranial nerves grossly intact.  Mentation and speech appropriate for age.  PSYCH: Appropriate affect, tone, and pace of words        Video-Visit Details    Type of service:  Video Visit   Originating Location (pt. Location): Home    Distant Location (provider location):  On-site  Platform used for Video Visit: Shane  Signed Electronically by:  Giovanni Soni MD

## 2024-05-31 ENCOUNTER — LAB (OUTPATIENT)
Dept: LAB | Facility: CLINIC | Age: 39
End: 2024-05-31
Payer: COMMERCIAL

## 2024-05-31 DIAGNOSIS — R53.83 FATIGUE, UNSPECIFIED TYPE: ICD-10-CM

## 2024-05-31 LAB
ALBUMIN SERPL BCG-MCNC: 4.5 G/DL (ref 3.5–5.2)
ALP SERPL-CCNC: 65 U/L (ref 40–150)
ALT SERPL W P-5'-P-CCNC: 33 U/L (ref 0–70)
ANION GAP SERPL CALCULATED.3IONS-SCNC: 12 MMOL/L (ref 7–15)
AST SERPL W P-5'-P-CCNC: 30 U/L (ref 0–45)
BILIRUB SERPL-MCNC: 0.3 MG/DL
BUN SERPL-MCNC: 10.8 MG/DL (ref 6–20)
CALCIUM SERPL-MCNC: 9.2 MG/DL (ref 8.6–10)
CHLORIDE SERPL-SCNC: 104 MMOL/L (ref 98–107)
CREAT SERPL-MCNC: 1.12 MG/DL (ref 0.67–1.17)
DEPRECATED HCO3 PLAS-SCNC: 24 MMOL/L (ref 22–29)
EGFRCR SERPLBLD CKD-EPI 2021: 86 ML/MIN/1.73M2
GLUCOSE SERPL-MCNC: 101 MG/DL (ref 70–99)
POTASSIUM SERPL-SCNC: 4.9 MMOL/L (ref 3.4–5.3)
PROT SERPL-MCNC: 6.8 G/DL (ref 6.4–8.3)
SODIUM SERPL-SCNC: 140 MMOL/L (ref 135–145)
TSH SERPL DL<=0.005 MIU/L-ACNC: 1.93 UIU/ML (ref 0.3–4.2)

## 2024-05-31 PROCEDURE — 80053 COMPREHEN METABOLIC PANEL: CPT

## 2024-05-31 PROCEDURE — 36415 COLL VENOUS BLD VENIPUNCTURE: CPT

## 2024-05-31 PROCEDURE — 84443 ASSAY THYROID STIM HORMONE: CPT

## 2024-05-31 PROCEDURE — 84403 ASSAY OF TOTAL TESTOSTERONE: CPT

## 2024-06-04 ENCOUNTER — HOSPITAL ENCOUNTER (OUTPATIENT)
Dept: CARDIOLOGY | Facility: CLINIC | Age: 39
Discharge: HOME | End: 2024-06-04
Attending: INTERNAL MEDICINE | Admitting: INTERNAL MEDICINE
Payer: COMMERCIAL

## 2024-06-04 DIAGNOSIS — I48.0 PAROXYSMAL ATRIAL FIBRILLATION (H): ICD-10-CM

## 2024-06-04 LAB
LVEF ECHO: NORMAL
TESTOST SERPL-MCNC: 496 NG/DL (ref 240–950)

## 2024-06-04 PROCEDURE — C8929 TTE W OR WO FOL WCON,DOPPLER: HCPCS

## 2024-06-04 PROCEDURE — 93306 TTE W/DOPPLER COMPLETE: CPT | Mod: 26 | Performed by: INTERNAL MEDICINE

## 2024-06-04 PROCEDURE — 255N000002 HC RX 255 OP 636: Performed by: INTERNAL MEDICINE

## 2024-06-04 RX ADMIN — HUMAN ALBUMIN MICROSPHERES AND PERFLUTREN 2 ML: 10; .22 INJECTION, SOLUTION INTRAVENOUS at 08:11

## 2024-08-16 DIAGNOSIS — I48.0 PAROXYSMAL ATRIAL FIBRILLATION (H): ICD-10-CM

## 2024-08-16 RX ORDER — METOPROLOL SUCCINATE 50 MG/1
50 TABLET, EXTENDED RELEASE ORAL DAILY
Qty: 90 TABLET | Refills: 1 | Status: SHIPPED | OUTPATIENT
Start: 2024-08-16

## 2024-08-16 NOTE — TELEPHONE ENCOUNTER
Prescription approved per [facility] Refill Protocol     Svetlana Bellamy     RN MSN

## 2024-09-05 ENCOUNTER — MYC MEDICAL ADVICE (OUTPATIENT)
Dept: FAMILY MEDICINE | Facility: CLINIC | Age: 39
End: 2024-09-05
Payer: COMMERCIAL

## 2024-09-05 NOTE — LETTER
September 6, 2024      Colt Alegria  [address] [address]        To Whom It May Concern:    Colt Alegria was seen in our clinic.   He has anxiety which is well controlled on lexapro.  Zachery is compliant and up to date with his treatment.      Sincerely,        Giovanni Soni MD

## 2024-09-05 NOTE — LETTER
September 18, 2024      Colt Alegria  [address] [address]        To Whom It May Concern:    Colt Alegria was seen in our clinic. He has anxiety which is well controlled on lexapro. Zachery is compliant and up to date with his treatment.   I see no reason he cannot perform the duties of a .        Sincerely,        Giovanni Soni MD

## 2024-09-11 ENCOUNTER — TELEPHONE (OUTPATIENT)
Dept: FAMILY MEDICINE | Facility: CLINIC | Age: 39
End: 2024-09-11
Payer: COMMERCIAL

## 2024-09-11 DIAGNOSIS — Z30.2 ENCOUNTER FOR STERILIZATION: Primary | ICD-10-CM

## 2024-09-11 NOTE — TELEPHONE ENCOUNTER
Order/Referral Request    Who is requesting: patient     Orders being requested: Post vasectomy analysis     Reason service is needed/diagnosis: post vasc check    When are orders needed by: ASAP    Has this been discussed with Provider: No    Does patient have a preference on a Group/Provider/Facility? [facility]    Does patient have an appointment scheduled?: No, Pt vasectomy was done on 3/3/22, but he never came back to complete the post semen analysis     Where to send orders: Place orders within Epic    Could we send this information to you in "[employer] or would you prefer to receive a phone call?:   Patient would prefer a phone call   Okay to leave a detailed message?: Yes at Cell number on file:    Telephone Information:   Mobile [phone]

## 2024-09-11 NOTE — TELEPHONE ENCOUNTER
I refreshed semen anlysis orders.  Iplease get s him sample bottle and instructiosn if he has lost his original ones.

## 2024-09-18 ENCOUNTER — LAB (OUTPATIENT)
Dept: LAB | Facility: CLINIC | Age: 39
End: 2024-09-18
Payer: COMMERCIAL

## 2024-09-18 DIAGNOSIS — Z30.2 ENCOUNTER FOR STERILIZATION: ICD-10-CM

## 2024-09-18 LAB — SEMEN ANALYSIS P VAS PNL: NORMAL

## 2024-09-18 PROCEDURE — 89321 SEMEN ANAL SPERM DETECTION: CPT

## 2024-11-05 ENCOUNTER — MYC REFILL (OUTPATIENT)
Dept: FAMILY MEDICINE | Facility: CLINIC | Age: 39
End: 2024-11-05
Payer: COMMERCIAL

## 2024-11-05 DIAGNOSIS — F33.9 EPISODE OF RECURRENT MAJOR DEPRESSIVE DISORDER, UNSPECIFIED DEPRESSION EPISODE SEVERITY (H): ICD-10-CM

## 2024-11-05 DIAGNOSIS — I48.0 PAROXYSMAL ATRIAL FIBRILLATION (H): ICD-10-CM

## 2024-11-05 RX ORDER — METOPROLOL SUCCINATE 50 MG/1
50 TABLET, EXTENDED RELEASE ORAL DAILY
Qty: 90 TABLET | Refills: 1 | Status: SHIPPED | OUTPATIENT
Start: 2024-11-05

## 2024-11-05 ASSESSMENT — PATIENT HEALTH QUESTIONNAIRE - PHQ9
SUM OF ALL RESPONSES TO PHQ QUESTIONS 1-9: 4
10. IF YOU CHECKED OFF ANY PROBLEMS, HOW DIFFICULT HAVE THESE PROBLEMS MADE IT FOR YOU TO DO YOUR WORK, TAKE CARE OF THINGS AT HOME, OR GET ALONG WITH OTHER PEOPLE: SOMEWHAT DIFFICULT
SUM OF ALL RESPONSES TO PHQ QUESTIONS 1-9: 4

## 2024-11-06 RX ORDER — ESCITALOPRAM OXALATE 10 MG/1
10 TABLET ORAL DAILY
Qty: 90 TABLET | Refills: 3 | Status: SHIPPED | OUTPATIENT
Start: 2024-11-06

## 2025-01-20 ENCOUNTER — PATIENT OUTREACH (OUTPATIENT)
Dept: CARE COORDINATION | Facility: CLINIC | Age: 40
End: 2025-01-20
Payer: COMMERCIAL

## (undated) RX ORDER — FENTANYL CITRATE 50 UG/ML
INJECTION, SOLUTION INTRAMUSCULAR; INTRAVENOUS
Status: DISPENSED
Start: 2018-10-17

## (undated) RX ORDER — LIDOCAINE HYDROCHLORIDE 10 MG/ML
INJECTION, SOLUTION EPIDURAL; INFILTRATION; INTRACAUDAL; PERINEURAL
Status: DISPENSED
Start: 2018-10-17

## (undated) RX ORDER — ONDANSETRON 2 MG/ML
INJECTION INTRAMUSCULAR; INTRAVENOUS
Status: DISPENSED
Start: 2018-10-17

## (undated) RX ORDER — PROPOFOL 10 MG/ML
INJECTION, EMULSION INTRAVENOUS
Status: DISPENSED
Start: 2018-10-17

## (undated) RX ORDER — DEXAMETHASONE SODIUM PHOSPHATE 4 MG/ML
INJECTION, SOLUTION INTRA-ARTICULAR; INTRALESIONAL; INTRAMUSCULAR; INTRAVENOUS; SOFT TISSUE
Status: DISPENSED
Start: 2018-10-17